# Patient Record
Sex: MALE | Race: WHITE | Employment: FULL TIME | URBAN - METROPOLITAN AREA
[De-identification: names, ages, dates, MRNs, and addresses within clinical notes are randomized per-mention and may not be internally consistent; named-entity substitution may affect disease eponyms.]

---

## 2017-03-30 ENCOUNTER — TELEPHONE (OUTPATIENT)
Dept: ORTHOPEDIC SURGERY | Age: 57
End: 2017-03-30

## 2017-06-12 ENCOUNTER — OFFICE VISIT (OUTPATIENT)
Dept: ORTHOPEDIC SURGERY | Age: 57
End: 2017-06-12

## 2017-06-12 VITALS
WEIGHT: 190 LBS | HEIGHT: 70 IN | SYSTOLIC BLOOD PRESSURE: 146 MMHG | DIASTOLIC BLOOD PRESSURE: 98 MMHG | BODY MASS INDEX: 27.2 KG/M2 | HEART RATE: 63 BPM

## 2017-06-12 DIAGNOSIS — Z96.659 HISTORY OF PARTIAL KNEE REPLACEMENT: Primary | ICD-10-CM

## 2017-06-12 DIAGNOSIS — M25.562 LEFT KNEE PAIN, UNSPECIFIED CHRONICITY: ICD-10-CM

## 2017-06-12 PROCEDURE — 73562 X-RAY EXAM OF KNEE 3: CPT | Performed by: ORTHOPAEDIC SURGERY

## 2017-06-12 PROCEDURE — 99213 OFFICE O/P EST LOW 20 MIN: CPT | Performed by: ORTHOPAEDIC SURGERY

## 2018-07-25 ENCOUNTER — OFFICE VISIT (OUTPATIENT)
Dept: ORTHOPEDIC SURGERY | Age: 58
End: 2018-07-25

## 2018-07-25 VITALS — WEIGHT: 184 LBS | HEIGHT: 70 IN | BODY MASS INDEX: 26.34 KG/M2

## 2018-07-25 DIAGNOSIS — S83.411A SPRAIN OF MEDIAL COLLATERAL LIGAMENT OF RIGHT KNEE, INITIAL ENCOUNTER: Primary | ICD-10-CM

## 2018-07-25 PROCEDURE — 99213 OFFICE O/P EST LOW 20 MIN: CPT | Performed by: PHYSICIAN ASSISTANT

## 2018-07-25 RX ORDER — MELOXICAM 15 MG/1
15 TABLET ORAL DAILY
Qty: 90 TABLET | Refills: 0 | Status: SHIPPED | OUTPATIENT
Start: 2018-07-25 | End: 2019-12-19 | Stop reason: ALTCHOICE

## 2018-07-25 NOTE — PROGRESS NOTES
Chief Complaint    Knee Pain (RT KNEE PAIN AFTER A TWISTING INJURY AT WORK)      History of Present Illness:  Anthony Kasper is a 62 y.o. male  Presents to the office today for a new problem. Patient presents today for his first visit and a Workmen's Compensation injury case. Patient is a  for [employer] and on 7/11/2018  Squatted and twisted and felt a sharp pain over the medial aspect of his knees attempting to  supplies. He has tried ibuprofen, a TENS unit, elevation, ice, and activity modifications without significant relief. He does have  Locking and catching symptoms. He has no past medical history contributing to the region. Pain Assessment  Location of Pain: Knee  Location Modifiers: Right  Severity of Pain: 8  Frequency of Pain: Intermittent  Aggravating Factors: Walking, Standing  Limiting Behavior: Some  Result of Injury: Yes  Work-Related Injury: Yes  Are there other pain locations you wish to document?: No    Medical History:  Past Medical History:   Diagnosis Date    Arthritis     Wears glasses     for reading     Patient Active Problem List    Diagnosis Date Noted    History of partial knee replacement 06/12/2017    Status post left partial knee replacement 04/20/2016    Primary osteoarthritis of left knee 98/82/9212    Plica syndrome 81/52/6649    Chondromalacia of patella 11/24/2014    Tear of medial cartilage or meniscus of knee, current 04/17/2014     Past Surgical History:   Procedure Laterality Date    CARPAL TUNNEL RELEASE Right 0621,1961 (approx)    X 2    COLONOSCOPY  10/22/2012    CYST REMOVAL  2007    rt wrist X2    HERNIA REPAIR  1992    x2--rt inguinal     JOINT REPLACEMENT      KNEE ARTHROSCOPY  2014    left knee    KNEE ARTHROSCOPY Left 12/24/14    partial medial & lateral meniscectomy.  chondrop;asty    KNEE SURGERY  2008, 2010    left, ARTHROSCOPY    OTHER SURGICAL HISTORY Left 4/20/16    LT KNEE ROBOTICALLY ASSISTED  MEDIAL UNICOMPARTMENTAL MAKOPLASTY      Family History   Problem Relation Age of Onset    Diabetes Father     Heart Disease Father     High Blood Pressure Father     Mult Sclerosis Paternal Uncle      Social History     Social History    Marital status:      Spouse name: N/A    Number of children: N/A    Years of education: N/A     Social History Main Topics    Smoking status: Former Smoker     Quit date: 8/31/1990    Smokeless tobacco: None    Alcohol use No    Drug use: No    Sexual activity: Not Asked     Other Topics Concern    None     Social History Narrative    None     Current Outpatient Prescriptions   Medication Sig Dispense Refill    meloxicam (MOBIC) 15 MG tablet Take 1 tablet by mouth daily 90 tablet 0    ibuprofen (ADVIL;MOTRIN) 200 MG tablet Take 200 mg by mouth every 6 hours as needed for Pain       No current facility-administered medications for this visit. Review of Systems:  Relevant review of systems reviewed and available in the patient's chart    Vital Signs:  Ht 5' 9.5\" (1.765 m)   Wt 184 lb (83.5 kg)   BMI 26.78 kg/m²     General Exam:   Constitutional: Patient is adequately groomed with no evidence of malnutrition  DTRs: Deep tendon reflexes are intact  Mental Status: The patient is oriented to time, place and person. The patient's mood and affect are appropriate. Lymphatic: The lymphatic examination bilaterally reveals all areas to be without enlargement or induration. Vascular: Examination reveals no swelling or calf tenderness. Peripheral pulses are palpable and 2+. Neurological: The patient has good coordination. There is no weakness or sensory deficit. Body mass index is 26.78 kg/m². Right Knee Examination:    Inspection:  No swelling, ecchymosis or deformity    Palpation:  Tenderness to palpation directly over the medial joint line.   Mild tenderness at the attachment insertion point of the MCL    Range of Motion:  Well-preserved range of motion,

## 2018-08-22 ENCOUNTER — OFFICE VISIT (OUTPATIENT)
Dept: ORTHOPEDIC SURGERY | Age: 58
End: 2018-08-22

## 2018-08-22 DIAGNOSIS — M17.11 PRIMARY OSTEOARTHRITIS OF RIGHT KNEE: ICD-10-CM

## 2018-08-22 DIAGNOSIS — S83.241A TEAR OF MEDIAL MENISCUS OF RIGHT KNEE, UNSPECIFIED TEAR TYPE, UNSPECIFIED WHETHER OLD OR CURRENT TEAR, INITIAL ENCOUNTER: Primary | ICD-10-CM

## 2018-08-22 PROCEDURE — 99214 OFFICE O/P EST MOD 30 MIN: CPT | Performed by: PHYSICIAN ASSISTANT

## 2018-08-22 NOTE — PROGRESS NOTES
Chief Complaint    Knee Pain (rt knee, locks up, painful)      History of Present Illness:  Koko Hawkins is a 62 y.o. male  turns today for follow-up on his right knee. Patient presents today for his Workmen's Compensation injury case. Patient is a  for [employer] and on 7/11/2018  Squatted and twisted and felt a sharp pain over the medial aspect of his knees attempting to  supplies. He has tried ibuprofen, a TENS unit, elevation, ice, and activity modifications without significant relief. He does have  Locking and catching symptoms. We ordered an MRI of the right knee and he is in today for the results. He has been doing better since she's been on vacation, but he still presents with symptoms with certain motions and activities. Results of the MRI indicate that he does have a 8 mm radial tear of the medial meniscus with mild to moderate osteoarthritic changes of medial compartment. No ligamentous injury.       Pain Assessment  Location of Pain: Knee  Location Modifiers: Right  Severity of Pain: 4  Frequency of Pain: Intermittent  Aggravating Factors: Walking, Standing, Stairs  Limiting Behavior: Some  Result of Injury: Yes  Work-Related Injury: Yes  Are there other pain locations you wish to document?: No    Medical History:  Past Medical History:   Diagnosis Date    Arthritis     Wears glasses     for reading     Patient Active Problem List    Diagnosis Date Noted    History of partial knee replacement 06/12/2017    Status post left partial knee replacement 04/20/2016    Primary osteoarthritis of left knee 57/83/6214    Plica syndrome 01/34/6522    Chondromalacia of patella 11/24/2014    Tear of medial cartilage or meniscus of knee, current 04/17/2014     Past Surgical History:   Procedure Laterality Date    CARPAL TUNNEL RELEASE Right [id number] (approx)    X 2    COLONOSCOPY  10/22/2012    CYST REMOVAL  2007    rt wrist X2    HERNIA REPAIR  1992    x2--rt inguinal     JOINT REPLACEMENT      KNEE ARTHROSCOPY  2014    left knee    KNEE ARTHROSCOPY Left 12/24/14    partial medial & lateral meniscectomy. chondrop;asty    KNEE SURGERY  2008, 2010    left, ARTHROSCOPY    OTHER SURGICAL HISTORY Left 4/20/16    LT KNEE ROBOTICALLY ASSISTED  MEDIAL UNICOMPARTMENTAL MAKOPLASTY      Family History   Problem Relation Age of Onset    Diabetes Father     Heart Disease Father     High Blood Pressure Father     Mult Sclerosis Paternal Uncle      Social History     Social History    Marital status:      Spouse name: N/A    Number of children: N/A    Years of education: N/A     Social History Main Topics    Smoking status: Former Smoker     Quit date: 8/31/1990    Smokeless tobacco: Not on file    Alcohol use No    Drug use: No    Sexual activity: Not on file     Other Topics Concern    Not on file     Social History Narrative    No narrative on file     Current Outpatient Prescriptions   Medication Sig Dispense Refill    meloxicam (MOBIC) 15 MG tablet Take 1 tablet by mouth daily 90 tablet 0    ibuprofen (ADVIL;MOTRIN) 200 MG tablet Take 200 mg by mouth every 6 hours as needed for Pain       No current facility-administered medications for this visit. Review of Systems:  Relevant review of systems reviewed and available in the patient's chart    Vital Signs: There were no vitals taken for this visit. General Exam:   Constitutional: Patient is adequately groomed with no evidence of malnutrition  DTRs: Deep tendon reflexes are intact  Mental Status: The patient is oriented to time, place and person. The patient's mood and affect are appropriate. Lymphatic: The lymphatic examination bilaterally reveals all areas to be without enlargement or induration. Vascular: Examination reveals no swelling or calf tenderness. Peripheral pulses are palpable and 2+. Neurological: The patient has good coordination. There is no weakness or sensory deficit.     There is no height or weight on file to calculate BMI. Right Knee Examination:    Inspection:  No swelling, ecchymosis or deformity    Palpation:  Tenderness to palpation directly over the medial joint line. Mild tenderness at the attachment insertion point of the MCL    Range of Motion:  Well-preserved range of motion, 0-120°. Strength:  4+/5 quad and hamstring strength    Special Tests:  Positive Celeste's examination. Stable to varus and valgus stress testing. Negative Lachman's exam.  Positive patellofemoral grind test    Skin: There are no rashes, ulcerations or lesions. Gait: Mild antalgic gait    Reflex normal deep tendon reflexes    Examination of the  Right and left hip reveals intact skin. The patient demonstrates full painless range of motion with regards to flexion, abduction, internal and external rotation. There is no tenderness about the greater trochanter. There is a negative straight leg raise against resistance. Strength is 5/5 throughout all planes. Radiology:                 Impression:  Encounter Diagnoses   Name Primary?  Tear of medial meniscus of right knee, unspecified tear type, unspecified whether old or current tear, initial encounter Yes    Primary osteoarthritis of right knee        Office Procedures:  No orders of the defined types were placed in this encounter. Treatment Plan:    I've gone over the MRI results with the patient and discussed the options for treatment. Regarding go ahead and recommend his claim to include a medial meniscus tear and osteoarthritis of medial compartment. Once this as been approved we have recommended proceeding with a right knee arthroscopy, partial medial meniscectomy, chondroplasty. Informed consent was obtained for this procedure today. We discussed the risks, benefits, and complications of arthroscopic knee surgery.  The patient realizes that there are concerns with this surgery with respect to infection, deep vein thrombosis, neurological injury, delayed  rehabilitation, the possibility of arthrofibrosis of the knee, and specifically  Hoffa's fat pad fibrosis that can potentially cause difficulties. The patient realizes that there are also anesthetic concerns including cardiopulmonary issues, pulmonary issues, and even possibility of death or dystrophy. The patient voiced understanding to this as well as the normal  rehabilitation  that   is involved with weeks of physical therapy, exercise, and strengthening. The patient also realizes that even though the surgery, from a functional perspective, typically allows the patient to return to good function at about 6 weeks, that it often takes 6 months to completely rehabilitate from this operation. The patient also realizes that if there is an arthritic component to the symptoms, then they may still have some degree of arthritis pain. I have given him some work restrictions limiting the amount of squatting and lifting that he'll be able to do until postoperative treatment has been rendered.

## 2018-09-24 ENCOUNTER — OFFICE VISIT (OUTPATIENT)
Dept: ORTHOPEDIC SURGERY | Age: 58
End: 2018-09-24
Payer: COMMERCIAL

## 2018-09-24 DIAGNOSIS — S83.241D ACUTE MEDIAL MENISCUS TEAR OF RIGHT KNEE, SUBSEQUENT ENCOUNTER: Primary | ICD-10-CM

## 2018-09-24 DIAGNOSIS — M17.11 OSTEOARTHRITIS OF RIGHT KNEE, UNSPECIFIED OSTEOARTHRITIS TYPE: ICD-10-CM

## 2018-09-24 PROCEDURE — 99213 OFFICE O/P EST LOW 20 MIN: CPT | Performed by: ORTHOPAEDIC SURGERY

## 2018-09-24 NOTE — PROGRESS NOTES
Chief Complaint    Knee Pain (rt knee [facility] check, still waiting on his amend dx)      History of Present Illness:  Anthony Kasper is a 62 y.o. male  turns today for follow-up on his right knee. Patient presents today for his Workmen's Compensation injury case. Patient is a  for [employer] and on 7/11/2018  Squatted and twisted and felt a sharp pain over the medial aspect of his knees attempting to  supplies. He has tried ibuprofen, a TENS unit, elevation, ice, and activity modifications without significant relief. Was diagnosed with  He has been doing better since she's been on vacation, but he still presents with symptoms with certain motions and activities. He continues to complain of catching and locking type symptoms. His symptoms have caused him to modify his everyday routine. Patient is currently working light duty. Medical History:  Past Medical History:   Diagnosis Date    Arthritis     Wears glasses     for reading     Patient Active Problem List    Diagnosis Date Noted    History of partial knee replacement 06/12/2017    Status post left partial knee replacement 04/20/2016    Primary osteoarthritis of left knee 58/48/2914    Plica syndrome 04/16/3678    Chondromalacia of patella 11/24/2014    Tear of medial cartilage or meniscus of knee, current 04/17/2014     Past Surgical History:   Procedure Laterality Date    CARPAL TUNNEL RELEASE Right [id number] (approx)    X 2    COLONOSCOPY  10/22/2012    CYST REMOVAL  2007    rt wrist X2    HERNIA REPAIR  1992    x2--rt inguinal     JOINT REPLACEMENT      KNEE ARTHROSCOPY  2014    left knee    KNEE ARTHROSCOPY Left 12/24/14    partial medial & lateral meniscectomy.  chondrop;asty    KNEE SURGERY  2008, 2010    left, ARTHROSCOPY    OTHER SURGICAL HISTORY Left 4/20/16    LT KNEE ROBOTICALLY ASSISTED  MEDIAL UNICOMPARTMENTAL MAKOPLASTY      Family History   Problem Relation Age of Onset    Diabetes Father     Heart Disease Father     High Blood Pressure Father     Mult Sclerosis Paternal Uncle      Social History     Social History    Marital status:      Spouse name: N/A    Number of children: N/A    Years of education: N/A     Social History Main Topics    Smoking status: Former Smoker     Quit date: 8/31/1990    Smokeless tobacco: None    Alcohol use No    Drug use: No    Sexual activity: Not Asked     Other Topics Concern    None     Social History Narrative    None     Current Outpatient Prescriptions   Medication Sig Dispense Refill    meloxicam (MOBIC) 15 MG tablet Take 1 tablet by mouth daily 90 tablet 0    ibuprofen (ADVIL;MOTRIN) 200 MG tablet Take 200 mg by mouth every 6 hours as needed for Pain       No current facility-administered medications for this visit. Review of Systems:  Relevant review of systems reviewed and available in the patient's chart    Vital Signs: There were no vitals taken for this visit. General Exam:   Constitutional: Patient is adequately groomed with no evidence of malnutrition  DTRs: Deep tendon reflexes are intact  Mental Status: The patient is oriented to time, place and person. The patient's mood and affect are appropriate. Lymphatic: The lymphatic examination bilaterally reveals all areas to be without enlargement or induration. Vascular: Examination reveals no swelling or calf tenderness. Peripheral pulses are palpable and 2+. Neurological: The patient has good coordination. There is no weakness or sensory deficit. There is no height or weight on file to calculate BMI. Right Knee Examination:    Inspection:  Positive knee joint effusion. No erythema. Palpation:  Tenderness to palpation directly over the medial joint line. No tenderness over the MCL attachment. Range of Motion:  Well-preserved range of motion, 0-120°. Strength:  4+/5 quad and hamstring strength    Special Tests:  Positive Celeste's examination.   Stable to varus and valgus stress testing. Negative Lachman's exam.  Positive patellofemoral grind test    Skin: There are no rashes, ulcerations or lesions. Gait: Mild antalgic gait    Reflex normal deep tendon reflexes    Examination of the  Right and left hip reveals intact skin. The patient demonstrates full painless range of motion with regards to flexion, abduction, internal and external rotation. There is no tenderness about the greater trochanter. There is a negative straight leg raise against resistance. Strength is 5/5 throughout all planes. Radiology:                 Impression:  Encounter Diagnoses   Name Primary?  Acute medial meniscus tear of right knee, subsequent encounter Yes    Osteoarthritis of right knee, unspecified osteoarthritis type        Office Procedures:  No orders of the defined types were placed in this encounter. Treatment Plan:    I've gone over the MRI results with the patient and discussed the options for treatment. Regarding go ahead and recommend his claim to include a medial meniscus tear and osteoarthritis of medial compartment. Once this as been approved we have recommended proceeding with a right knee arthroscopy, partial medial meniscectomy, chondroplasty. Informed consent was obtained for this procedure today. Patient will continue working light duty at this time. Follow-up in 4 weeks. We discussed the risks, benefits, and complications of arthroscopic knee surgery. The patient realizes that there are concerns with this surgery with respect to infection, deep vein thrombosis, neurological injury, delayed  rehabilitation, the possibility of arthrofibrosis of the knee, and specifically  Hoffa's fat pad fibrosis that can potentially cause difficulties. The patient realizes that there are also anesthetic concerns including cardiopulmonary issues, pulmonary issues, and even possibility of death or dystrophy.  The patient voiced understanding to this as well as the normal

## 2018-10-31 ENCOUNTER — OFFICE VISIT (OUTPATIENT)
Dept: ORTHOPEDIC SURGERY | Age: 58
End: 2018-10-31
Payer: COMMERCIAL

## 2018-10-31 DIAGNOSIS — S83.241D ACUTE MEDIAL MENISCUS TEAR OF RIGHT KNEE, SUBSEQUENT ENCOUNTER: Primary | ICD-10-CM

## 2018-10-31 DIAGNOSIS — S86.911D STRAIN OF RIGHT KNEE AND LEG, SUBSEQUENT ENCOUNTER: ICD-10-CM

## 2018-10-31 PROCEDURE — 99212 OFFICE O/P EST SF 10 MIN: CPT | Performed by: PHYSICIAN ASSISTANT

## 2018-10-31 NOTE — PROGRESS NOTES
range of motion, 0-120°. Strength:  4+/5 quad and hamstring strength    Special Tests:  Positive Celeste's examination. Stable to varus and valgus stress testing. Negative Lachman's exam.  Positive patellofemoral grind test    Skin: There are no rashes, ulcerations or lesions. Gait: Mild antalgic gait    Reflex normal deep tendon reflexes    Radiology:                 Impression:  Encounter Diagnoses   Name Primary?  Acute medial meniscus tear of right knee, subsequent encounter Yes    Strain of right knee and leg, subsequent encounter        Office Procedures:  No orders of the defined types were placed in this encounter. Treatment Plan: At this time we are still waiting for [facility] approval for the recommended diagnosis codes. Once these are approved we'll go ahead and proceed with a right knee arthroscopy. We'll keep him on his current restrictions at this time. No changes in plan of care.

## 2018-11-28 ENCOUNTER — OFFICE VISIT (OUTPATIENT)
Dept: ORTHOPEDIC SURGERY | Age: 58
End: 2018-11-28
Payer: COMMERCIAL

## 2018-11-28 DIAGNOSIS — M17.11 OSTEOARTHRITIS OF RIGHT KNEE, UNSPECIFIED OSTEOARTHRITIS TYPE: Primary | ICD-10-CM

## 2018-11-28 DIAGNOSIS — S83.241D ACUTE MEDIAL MENISCUS TEAR OF RIGHT KNEE, SUBSEQUENT ENCOUNTER: ICD-10-CM

## 2018-11-28 PROBLEM — S83.241A ACUTE MEDIAL MENISCUS TEAR OF RIGHT KNEE: Status: ACTIVE | Noted: 2018-11-28

## 2018-11-28 PROCEDURE — 99213 OFFICE O/P EST LOW 20 MIN: CPT | Performed by: PHYSICIAN ASSISTANT

## 2018-11-28 NOTE — PROGRESS NOTES
Chief Complaint    Knee Pain (rt knee still waiting on dx to be amended)      History of Present Illness:  Marisa Sears is a 62 y.o. male  turns today for follow-up on his right knee. Patient presents today for his Workmen's Compensation injury case. Claim next week as they are denying the claim for exacerbation of osteoarthritis. Patient is a  for [employer] and on 7/11/2018  Squatted and twisted and felt a sharp pain over the medial aspect of his knees attempting to  supplies. Medial meniscus tear. He continues to have medial knee pain with catching and locking of the medial compartment. The area swells particularly towards the end of the day. Medical History:  Past Medical History:   Diagnosis Date    Arthritis     Wears glasses     for reading     Patient Active Problem List    Diagnosis Date Noted    Acute medial meniscus tear of right knee 11/28/2018    Osteoarthritis of right knee 11/28/2018    History of partial knee replacement 06/12/2017    Status post left partial knee replacement 04/20/2016    Primary osteoarthritis of left knee 43/48/9278    Plica syndrome 30/81/8323    Chondromalacia of patella 11/24/2014    Tear of medial cartilage or meniscus of knee, current 04/17/2014     Past Surgical History:   Procedure Laterality Date    CARPAL TUNNEL RELEASE Right 3657,[id number] (approx)    X 2    COLONOSCOPY  10/22/2012    CYST REMOVAL  2007    rt wrist X2    HERNIA REPAIR  1992    x2--rt inguinal     JOINT REPLACEMENT      KNEE ARTHROSCOPY  2014    left knee    KNEE ARTHROSCOPY Left 12/24/14    partial medial & lateral meniscectomy.  chondrop;asty    KNEE SURGERY  2008, 2010    left, ARTHROSCOPY    OTHER SURGICAL HISTORY Left 4/20/16    LT KNEE ROBOTICALLY ASSISTED  MEDIAL UNICOMPARTMENTAL MAKOPLASTY      Family History   Problem Relation Age of Onset    Diabetes Father     Heart Disease Father     High Blood Pressure Father     Mult Sclerosis Paternal Uncle      Social History     Social History    Marital status:      Spouse name: N/A    Number of children: N/A    Years of education: N/A     Social History Main Topics    Smoking status: Former Smoker     Quit date: 8/31/1990    Smokeless tobacco: Not on file    Alcohol use No    Drug use: No    Sexual activity: Not on file     Other Topics Concern    Not on file     Social History Narrative    No narrative on file     Current Outpatient Prescriptions   Medication Sig Dispense Refill    meloxicam (MOBIC) 15 MG tablet Take 1 tablet by mouth daily 90 tablet 0    ibuprofen (ADVIL;MOTRIN) 200 MG tablet Take 200 mg by mouth every 6 hours as needed for Pain       No current facility-administered medications for this visit. Review of Systems:  Relevant review of systems reviewed and available in the patient's chart    Vital Signs: There were no vitals taken for this visit. General Exam:   Constitutional: Patient is adequately groomed with no evidence of malnutrition  DTRs: Deep tendon reflexes are intact  Mental Status: The patient is oriented to time, place and person. The patient's mood and affect are appropriate. Lymphatic: The lymphatic examination bilaterally reveals all areas to be without enlargement or induration. Vascular: Examination reveals no swelling or calf tenderness. Peripheral pulses are palpable and 2+. Neurological: The patient has good coordination. There is no weakness or sensory deficit. There is no height or weight on file to calculate BMI. Right Knee Examination:    Inspection:  Positive knee joint effusion. No erythema. Palpation:  Tenderness to palpation directly over the medial joint line. No tenderness over the MCL attachment. Range of Motion:  Well-preserved range of motion, 0-120°. Strength:  4+/5 quad and hamstring strength    Special Tests:  Positive Celeste's examination. Stable to varus and valgus stress testing.   Negative Lachman's exam.  Positive patellofemoral grind test    Skin: There are no rashes, ulcerations or lesions. Gait: Mild antalgic gait    Reflex normal deep tendon reflexes            Impression:  Encounter Diagnoses   Name Primary?  Osteoarthritis of right knee, unspecified osteoarthritis type Yes    Acute medial meniscus tear of right knee, subsequent encounter        Office Procedures:  No orders of the defined types were placed in this encounter. Treatment Plan: At this time we are still waiting for [facility] approval for the recommended diagnosis codes. Once these are approved we'll go ahead and proceed with a right knee arthroscopy. We'll keep him on his current restrictions at this time. No changes in plan of care.

## 2019-01-02 ENCOUNTER — OFFICE VISIT (OUTPATIENT)
Dept: ORTHOPEDIC SURGERY | Age: 59
End: 2019-01-02
Payer: COMMERCIAL

## 2019-01-02 DIAGNOSIS — M17.11 OSTEOARTHRITIS OF RIGHT KNEE, UNSPECIFIED OSTEOARTHRITIS TYPE: ICD-10-CM

## 2019-01-02 DIAGNOSIS — S83.241D ACUTE MEDIAL MENISCUS TEAR OF RIGHT KNEE, SUBSEQUENT ENCOUNTER: Primary | ICD-10-CM

## 2019-01-02 PROCEDURE — 99213 OFFICE O/P EST LOW 20 MIN: CPT | Performed by: PHYSICIAN ASSISTANT

## 2019-01-21 ENCOUNTER — TELEPHONE (OUTPATIENT)
Dept: ORTHOPEDIC SURGERY | Age: 59
End: 2019-01-21

## 2019-03-01 ENCOUNTER — TELEPHONE (OUTPATIENT)
Dept: ORTHOPEDIC SURGERY | Age: 59
End: 2019-03-01

## 2019-03-08 ENCOUNTER — ANESTHESIA (OUTPATIENT)
Dept: OPERATING ROOM | Age: 59
End: 2019-03-08
Payer: COMMERCIAL

## 2019-03-08 ENCOUNTER — HOSPITAL ENCOUNTER (OUTPATIENT)
Age: 59
Setting detail: OUTPATIENT SURGERY
Discharge: HOME | End: 2019-03-08
Attending: ORTHOPAEDIC SURGERY | Admitting: ORTHOPAEDIC SURGERY
Payer: COMMERCIAL

## 2019-03-08 ENCOUNTER — ANESTHESIA EVENT (OUTPATIENT)
Dept: OPERATING ROOM | Age: 59
End: 2019-03-08
Payer: COMMERCIAL

## 2019-03-08 VITALS
HEART RATE: 59 BPM | RESPIRATION RATE: 18 BRPM | SYSTOLIC BLOOD PRESSURE: 149 MMHG | BODY MASS INDEX: 27.49 KG/M2 | HEIGHT: 70 IN | TEMPERATURE: 96.9 F | WEIGHT: 192 LBS | DIASTOLIC BLOOD PRESSURE: 85 MMHG | OXYGEN SATURATION: 100 %

## 2019-03-08 VITALS
SYSTOLIC BLOOD PRESSURE: 136 MMHG | OXYGEN SATURATION: 97 % | DIASTOLIC BLOOD PRESSURE: 81 MMHG | RESPIRATION RATE: 4 BRPM

## 2019-03-08 DIAGNOSIS — S83.241D ACUTE MEDIAL MENISCUS TEAR OF RIGHT KNEE, SUBSEQUENT ENCOUNTER: Primary | ICD-10-CM

## 2019-03-08 PROCEDURE — 7100000001 HC PACU RECOVERY - ADDTL 15 MIN: Performed by: ORTHOPAEDIC SURGERY

## 2019-03-08 PROCEDURE — 2709999900 HC NON-CHARGEABLE SUPPLY: Performed by: ORTHOPAEDIC SURGERY

## 2019-03-08 PROCEDURE — 2580000003 HC RX 258: Performed by: ORTHOPAEDIC SURGERY

## 2019-03-08 PROCEDURE — 2580000003 HC RX 258: Performed by: NURSE ANESTHETIST, CERTIFIED REGISTERED

## 2019-03-08 PROCEDURE — 2500000003 HC RX 250 WO HCPCS: Performed by: ORTHOPAEDIC SURGERY

## 2019-03-08 PROCEDURE — 7100000000 HC PACU RECOVERY - FIRST 15 MIN: Performed by: ORTHOPAEDIC SURGERY

## 2019-03-08 PROCEDURE — 3600000014 HC SURGERY LEVEL 4 ADDTL 15MIN: Performed by: ORTHOPAEDIC SURGERY

## 2019-03-08 PROCEDURE — 6360000002 HC RX W HCPCS: Performed by: ORTHOPAEDIC SURGERY

## 2019-03-08 PROCEDURE — 7100000011 HC PHASE II RECOVERY - ADDTL 15 MIN: Performed by: ORTHOPAEDIC SURGERY

## 2019-03-08 PROCEDURE — 7100000010 HC PHASE II RECOVERY - FIRST 15 MIN: Performed by: ORTHOPAEDIC SURGERY

## 2019-03-08 PROCEDURE — 3600000004 HC SURGERY LEVEL 4 BASE: Performed by: ORTHOPAEDIC SURGERY

## 2019-03-08 PROCEDURE — 2500000003 HC RX 250 WO HCPCS: Performed by: NURSE ANESTHETIST, CERTIFIED REGISTERED

## 2019-03-08 PROCEDURE — 3700000001 HC ADD 15 MINUTES (ANESTHESIA): Performed by: ORTHOPAEDIC SURGERY

## 2019-03-08 PROCEDURE — 3700000000 HC ANESTHESIA ATTENDED CARE: Performed by: ORTHOPAEDIC SURGERY

## 2019-03-08 PROCEDURE — 6360000002 HC RX W HCPCS: Performed by: NURSE ANESTHETIST, CERTIFIED REGISTERED

## 2019-03-08 PROCEDURE — 2580000003 HC RX 258: Performed by: ANESTHESIOLOGY

## 2019-03-08 RX ORDER — DEXAMETHASONE SODIUM PHOSPHATE 10 MG/ML
INJECTION INTRAMUSCULAR; INTRAVENOUS PRN
Status: DISCONTINUED | OUTPATIENT
Start: 2019-03-08 | End: 2019-03-08 | Stop reason: SDUPTHER

## 2019-03-08 RX ORDER — ONDANSETRON 2 MG/ML
INJECTION INTRAMUSCULAR; INTRAVENOUS PRN
Status: DISCONTINUED | OUTPATIENT
Start: 2019-03-08 | End: 2019-03-08 | Stop reason: SDUPTHER

## 2019-03-08 RX ORDER — PROPOFOL 10 MG/ML
INJECTION, EMULSION INTRAVENOUS PRN
Status: DISCONTINUED | OUTPATIENT
Start: 2019-03-08 | End: 2019-03-08 | Stop reason: SDUPTHER

## 2019-03-08 RX ORDER — BUPIVACAINE HYDROCHLORIDE 2.5 MG/ML
INJECTION, SOLUTION INFILTRATION; PERINEURAL PRN
Status: DISCONTINUED | OUTPATIENT
Start: 2019-03-08 | End: 2019-03-08 | Stop reason: ALTCHOICE

## 2019-03-08 RX ORDER — SODIUM CHLORIDE, SODIUM LACTATE, POTASSIUM CHLORIDE, CALCIUM CHLORIDE 600; 310; 30; 20 MG/100ML; MG/100ML; MG/100ML; MG/100ML
INJECTION, SOLUTION INTRAVENOUS CONTINUOUS
Status: DISCONTINUED | OUTPATIENT
Start: 2019-03-08 | End: 2019-03-08 | Stop reason: HOSPADM

## 2019-03-08 RX ORDER — HYDROCODONE BITARTRATE AND ACETAMINOPHEN 5; 325 MG/1; MG/1
1 TABLET ORAL EVERY 6 HOURS PRN
Qty: 28 TABLET | Refills: 0 | Status: SHIPPED | OUTPATIENT
Start: 2019-03-08 | End: 2019-03-15

## 2019-03-08 RX ORDER — SODIUM CHLORIDE, SODIUM LACTATE, POTASSIUM CHLORIDE, AND CALCIUM CHLORIDE .6; .31; .03; .02 G/100ML; G/100ML; G/100ML; G/100ML
IRRIGANT IRRIGATION PRN
Status: DISCONTINUED | OUTPATIENT
Start: 2019-03-08 | End: 2019-03-08 | Stop reason: ALTCHOICE

## 2019-03-08 RX ORDER — SODIUM CHLORIDE, SODIUM LACTATE, POTASSIUM CHLORIDE, CALCIUM CHLORIDE 600; 310; 30; 20 MG/100ML; MG/100ML; MG/100ML; MG/100ML
INJECTION, SOLUTION INTRAVENOUS CONTINUOUS PRN
Status: DISCONTINUED | OUTPATIENT
Start: 2019-03-08 | End: 2019-03-08 | Stop reason: SDUPTHER

## 2019-03-08 RX ORDER — LIDOCAINE HYDROCHLORIDE 10 MG/ML
2 INJECTION, SOLUTION INFILTRATION; PERINEURAL
Status: DISCONTINUED | OUTPATIENT
Start: 2019-03-08 | End: 2019-03-08 | Stop reason: HOSPADM

## 2019-03-08 RX ORDER — FENTANYL CITRATE 50 UG/ML
INJECTION, SOLUTION INTRAMUSCULAR; INTRAVENOUS PRN
Status: DISCONTINUED | OUTPATIENT
Start: 2019-03-08 | End: 2019-03-08 | Stop reason: SDUPTHER

## 2019-03-08 RX ORDER — GLYCOPYRROLATE 0.2 MG/ML
INJECTION INTRAMUSCULAR; INTRAVENOUS PRN
Status: DISCONTINUED | OUTPATIENT
Start: 2019-03-08 | End: 2019-03-08 | Stop reason: SDUPTHER

## 2019-03-08 RX ORDER — LIDOCAINE HYDROCHLORIDE 20 MG/ML
INJECTION, SOLUTION INFILTRATION; PERINEURAL PRN
Status: DISCONTINUED | OUTPATIENT
Start: 2019-03-08 | End: 2019-03-08 | Stop reason: SDUPTHER

## 2019-03-08 RX ADMIN — FENTANYL CITRATE 25 MCG: 50 INJECTION INTRAMUSCULAR; INTRAVENOUS at 12:46

## 2019-03-08 RX ADMIN — Medication 2 G: at 12:26

## 2019-03-08 RX ADMIN — GLYCOPYRROLATE 0.1 MG: 0.2 INJECTION, SOLUTION INTRAMUSCULAR; INTRAVENOUS at 12:45

## 2019-03-08 RX ADMIN — FENTANYL CITRATE 25 MCG: 50 INJECTION INTRAMUSCULAR; INTRAVENOUS at 12:29

## 2019-03-08 RX ADMIN — DEXAMETHASONE SODIUM PHOSPHATE 10 MG: 10 INJECTION INTRAMUSCULAR; INTRAVENOUS at 12:28

## 2019-03-08 RX ADMIN — ONDANSETRON 4 MG: 2 INJECTION INTRAMUSCULAR; INTRAVENOUS at 12:28

## 2019-03-08 RX ADMIN — SODIUM CHLORIDE, POTASSIUM CHLORIDE, SODIUM LACTATE AND CALCIUM CHLORIDE: 600; 310; 30; 20 INJECTION, SOLUTION INTRAVENOUS at 12:22

## 2019-03-08 RX ADMIN — SODIUM CHLORIDE, POTASSIUM CHLORIDE, SODIUM LACTATE AND CALCIUM CHLORIDE: 600; 310; 30; 20 INJECTION, SOLUTION INTRAVENOUS at 12:11

## 2019-03-08 RX ADMIN — LIDOCAINE HYDROCHLORIDE 80 MG: 20 INJECTION, SOLUTION INFILTRATION; PERINEURAL at 12:25

## 2019-03-08 RX ADMIN — FENTANYL CITRATE 25 MCG: 50 INJECTION INTRAMUSCULAR; INTRAVENOUS at 12:36

## 2019-03-08 RX ADMIN — PROPOFOL 200 MG: 10 INJECTION, EMULSION INTRAVENOUS at 12:26

## 2019-03-08 RX ADMIN — GLYCOPYRROLATE 0.1 MG: 0.2 INJECTION, SOLUTION INTRAMUSCULAR; INTRAVENOUS at 12:41

## 2019-03-08 ASSESSMENT — PULMONARY FUNCTION TESTS
PIF_VALUE: 2
PIF_VALUE: 2
PIF_VALUE: 0
PIF_VALUE: 1
PIF_VALUE: 1
PIF_VALUE: 2
PIF_VALUE: 3
PIF_VALUE: 0
PIF_VALUE: 1
PIF_VALUE: 0
PIF_VALUE: 1
PIF_VALUE: 2
PIF_VALUE: 1
PIF_VALUE: 2
PIF_VALUE: 1
PIF_VALUE: 2
PIF_VALUE: 3
PIF_VALUE: 2
PIF_VALUE: 1
PIF_VALUE: 1
PIF_VALUE: 2
PIF_VALUE: 8
PIF_VALUE: 2
PIF_VALUE: 3
PIF_VALUE: 2
PIF_VALUE: 1
PIF_VALUE: 2
PIF_VALUE: 2
PIF_VALUE: 18
PIF_VALUE: 2
PIF_VALUE: 2
PIF_VALUE: 0
PIF_VALUE: 2
PIF_VALUE: 2

## 2019-03-08 ASSESSMENT — PAIN - FUNCTIONAL ASSESSMENT: PAIN_FUNCTIONAL_ASSESSMENT: 0-10

## 2019-03-08 ASSESSMENT — PAIN SCALES - GENERAL
PAINLEVEL_OUTOF10: 0

## 2019-03-08 ASSESSMENT — PAIN DESCRIPTION - DESCRIPTORS: DESCRIPTORS: NAGGING;THROBBING

## 2019-03-13 ENCOUNTER — HOSPITAL ENCOUNTER (OUTPATIENT)
Dept: PHYSICAL THERAPY | Age: 59
Setting detail: THERAPIES SERIES
Discharge: HOME | End: 2019-03-13
Payer: COMMERCIAL

## 2019-03-13 ENCOUNTER — OFFICE VISIT (OUTPATIENT)
Dept: ORTHOPEDIC SURGERY | Age: 59
End: 2019-03-13

## 2019-03-13 DIAGNOSIS — S83.241A TEAR OF MEDIAL MENISCUS OF RIGHT KNEE, UNSPECIFIED TEAR TYPE, UNSPECIFIED WHETHER OLD OR CURRENT TEAR, INITIAL ENCOUNTER: Primary | ICD-10-CM

## 2019-03-13 PROCEDURE — 97016 VASOPNEUMATIC DEVICE THERAPY: CPT | Performed by: PHYSICAL THERAPIST

## 2019-03-13 PROCEDURE — G8978 MOBILITY CURRENT STATUS: HCPCS | Performed by: PHYSICAL THERAPIST

## 2019-03-13 PROCEDURE — G0283 ELEC STIM OTHER THAN WOUND: HCPCS | Performed by: PHYSICAL THERAPIST

## 2019-03-13 PROCEDURE — 97110 THERAPEUTIC EXERCISES: CPT | Performed by: PHYSICAL THERAPIST

## 2019-03-13 PROCEDURE — 97161 PT EVAL LOW COMPLEX 20 MIN: CPT | Performed by: PHYSICAL THERAPIST

## 2019-03-13 PROCEDURE — 99024 POSTOP FOLLOW-UP VISIT: CPT | Performed by: PHYSICIAN ASSISTANT

## 2019-03-15 ENCOUNTER — HOSPITAL ENCOUNTER (OUTPATIENT)
Dept: PHYSICAL THERAPY | Age: 59
Setting detail: THERAPIES SERIES
Discharge: HOME | End: 2019-03-15
Payer: COMMERCIAL

## 2019-03-18 ENCOUNTER — HOSPITAL ENCOUNTER (OUTPATIENT)
Dept: PHYSICAL THERAPY | Age: 59
Setting detail: THERAPIES SERIES
Discharge: HOME | End: 2019-03-18
Payer: COMMERCIAL

## 2019-03-18 PROCEDURE — 97140 MANUAL THERAPY 1/> REGIONS: CPT

## 2019-03-18 PROCEDURE — 97110 THERAPEUTIC EXERCISES: CPT

## 2019-03-18 PROCEDURE — 97112 NEUROMUSCULAR REEDUCATION: CPT

## 2019-03-18 PROCEDURE — 97016 VASOPNEUMATIC DEVICE THERAPY: CPT

## 2019-03-18 PROCEDURE — G0283 ELEC STIM OTHER THAN WOUND: HCPCS

## 2019-03-21 ENCOUNTER — HOSPITAL ENCOUNTER (OUTPATIENT)
Dept: PHYSICAL THERAPY | Age: 59
Setting detail: THERAPIES SERIES
Discharge: HOME | End: 2019-03-21
Payer: COMMERCIAL

## 2019-03-21 PROCEDURE — 97112 NEUROMUSCULAR REEDUCATION: CPT | Performed by: PHYSICAL THERAPIST

## 2019-03-21 PROCEDURE — G0283 ELEC STIM OTHER THAN WOUND: HCPCS | Performed by: PHYSICAL THERAPIST

## 2019-03-21 PROCEDURE — 97016 VASOPNEUMATIC DEVICE THERAPY: CPT | Performed by: PHYSICAL THERAPIST

## 2019-03-21 PROCEDURE — 97140 MANUAL THERAPY 1/> REGIONS: CPT | Performed by: PHYSICAL THERAPIST

## 2019-03-21 PROCEDURE — 97110 THERAPEUTIC EXERCISES: CPT | Performed by: PHYSICAL THERAPIST

## 2019-03-25 ENCOUNTER — HOSPITAL ENCOUNTER (OUTPATIENT)
Dept: PHYSICAL THERAPY | Age: 59
Setting detail: THERAPIES SERIES
Discharge: HOME | End: 2019-03-25
Payer: COMMERCIAL

## 2019-03-25 PROCEDURE — 97112 NEUROMUSCULAR REEDUCATION: CPT

## 2019-03-25 PROCEDURE — 97140 MANUAL THERAPY 1/> REGIONS: CPT

## 2019-03-25 PROCEDURE — 97110 THERAPEUTIC EXERCISES: CPT

## 2019-03-25 PROCEDURE — 97016 VASOPNEUMATIC DEVICE THERAPY: CPT

## 2019-03-25 PROCEDURE — G0283 ELEC STIM OTHER THAN WOUND: HCPCS

## 2019-03-28 ENCOUNTER — HOSPITAL ENCOUNTER (OUTPATIENT)
Dept: PHYSICAL THERAPY | Age: 59
Setting detail: THERAPIES SERIES
Discharge: HOME | End: 2019-03-28
Payer: COMMERCIAL

## 2019-03-28 PROCEDURE — 97110 THERAPEUTIC EXERCISES: CPT | Performed by: PHYSICAL THERAPIST

## 2019-03-28 PROCEDURE — 97140 MANUAL THERAPY 1/> REGIONS: CPT | Performed by: PHYSICAL THERAPIST

## 2019-03-28 PROCEDURE — G0283 ELEC STIM OTHER THAN WOUND: HCPCS | Performed by: PHYSICAL THERAPIST

## 2019-03-28 PROCEDURE — 97016 VASOPNEUMATIC DEVICE THERAPY: CPT | Performed by: PHYSICAL THERAPIST

## 2019-03-28 PROCEDURE — 97112 NEUROMUSCULAR REEDUCATION: CPT | Performed by: PHYSICAL THERAPIST

## 2019-04-02 ENCOUNTER — HOSPITAL ENCOUNTER (OUTPATIENT)
Dept: PHYSICAL THERAPY | Age: 59
Setting detail: THERAPIES SERIES
Discharge: HOME | End: 2019-04-02
Payer: COMMERCIAL

## 2019-04-02 PROCEDURE — 97112 NEUROMUSCULAR REEDUCATION: CPT

## 2019-04-02 PROCEDURE — 97110 THERAPEUTIC EXERCISES: CPT

## 2019-04-02 PROCEDURE — 97140 MANUAL THERAPY 1/> REGIONS: CPT

## 2019-04-02 PROCEDURE — 97016 VASOPNEUMATIC DEVICE THERAPY: CPT

## 2019-04-02 NOTE — FLOWSHEET NOTE
[facility], [address] [place]  Phone: [phone]  Fax [phone]      ATHLETIC TRAINING [address]  Date:  2019    Patient Name:  David Kay    :  1960  MRN: [id number]  Restrictions/Precautions:    Medical/Treatment Diagnosis Information:  ·   Diagnosis: R knee MMT s/p scope for PMM sx 3/8/19 S83.241D  · Treatment Diagnosis: R knee pain M25.561   Physician Information:  Dr. Ana Rodriguez Post-op  8 wks  12 wks 16 wks 20 wks   24 wks                            Activity Log                                                  DOS/DOI:                                                    Date: 03/25/19  3/28/19 04/02/19   ATC communication      Bike      Elliptical      Treadmill      Airdyne            Gastroc stretch      Soleus stretch      Hamstring stretch      ITB stretch      Hip Flexor stretch      Quad stretch      Adductor stretch            Weight Shifting sp                                fp                                tp      Lateral walking (with/w/o TB)            Balance: PEP/Gilda board                     SLS            Star excursion load/explode            Extremity reach UE/LE            Leg Press Yunior. 60# 2x10 60# 3x10 60# 3x12                     Ecc. 40# 2x10 40# 3x12                     Inv. Calf Press Yunior.  60# 2x10 60# 3x10 60# 3x12                      Ecc.                          Inv.            CARLOS   Flex                 ABd 45# R/L 2x10 45# R/L 2x12 45# R/L 3x10              Add                TKE 45# 20x5\" 60# 20x5\" 60# 30x5\" w/dorsiflexion              Ext 45# R/L 2x10 45# R/L 2x12 45# R/L 3x10         Steps Up                 Up and Over                 Down                 Lateral                 Rotation            Squats  mini                    wall                   BOSU             Lunges:  Lunge to Balance                     Balance to Lunge Walking            Knee Extension Bilat. Ecc.                                 Inv. Hamstring Curls Bilat. 40# 2x10                              Ecc.                                 Inv.            Soleus Press Bilat. Ecc.                             Inv.                                   Ladders                  Square                 Jump/Hop  Low                        Med.                        High                                                                  Modality GR 15' GR 15' GR 15'   Initials                             EP DTM EP   Time spent one on one (workers comp)      Time spent with PT assistant

## 2019-04-02 NOTE — FLOWSHEET NOTE
[facility], [address] [place] [address] [place]  Phone: [phone]  Fax [phone]    Physical Therapy Daily Treatment Note  Date:  2019    Patient Name:  Rupal Hale    :  1960  MRN: [id number]  Restrictions/Precautions:    Medical/Treatment Diagnosis Information:  Diagnosis: R knee MMT s/p scope for PMM sx 3/8/19 S83.241D  Treatment Diagnosis: R knee pain D29.486   Insurance/Certification information:  PT Insurance Information: [facility]   Physician Information:  Referring Practitioner: Dr. Gil Montgomery of care signed (Y/N): sent; POC expires 19     Date of Patient follow up with Physician: 4/10/19     G-Code (if applicable):      Date G-Code Applied:  3/13/19 LEFS 94%       Progress Note: []  Yes  [x]  No  Next due by: Visit #10       Latex Allergy:  [x]NO      []YES  Preferred Language for Healthcare:   [x]English       []other:    Visit # Insurance Allowable Requires auth   6  through 5/3/19     []no        [x]yes:[facility]       Pain level:  3 /10 R knee     SUBJECTIVE:Pt states that his right knee is feeling better    OBJECTIVE: 3.5 weeks p/o   Observation: Pt enters clinic waling w/o AD. Good gait @ slow pace. Improve eccentric quad control from 2\" step descends.   Test measurements:    RESTRICTIONS/PRECAUTIONS: DOI 18     Exercises/Interventions:     Therapeutic Ex Sets/sec Reps Notes   Bike 5 min     Gastroc Stretch on Incline H30x3     Monster @ Half Wall fwd & BWD GTB @ Ankles  2 laps     Plank H10x10     SAQ Mat's Edge 5# H5 2x10     Supine Psoas Release w/ knee flex stretch w/ strap H30x3     SL hip Abd w/ ext press into wall H3 2x10 2# +hep   Supine IT Band Stretch w/ strap H30x3     Prone Quad Set w/ shin press into ball H10x10  +hep   Prone Quad stretch w/ strap H30x3    +hep   Supine QS with SLR  3\" 2x10   Hep    SB bridge with SLR H5 2x10      See ATC sheet  See note  Started 3/21/19 Manual Intervention      STM/Rolling right IT Band, Quad, and Calf 15 min                                   NMR re-education      Posterior Disc Slides OTB 3x10     LSU w/ hips abd Light GTB 4\" 2x10     FSU 6\" 2x10     SLS w/ forward wt tap to step stool 3# 2x10      Reverse BOSU Mini Squat H3 x20     LSD 2\" 3x10 W/ mirror              Therapeutic Exercise and NMR EXR  [x] (75136) Provided verbal/tactile cueing for activities related to strengthening, flexibility, endurance, ROM for improvements in LE, proximal hip, and core control with self care, mobility, lifting, ambulation.  [] (56996) Provided verbal/tactile cueing for activities related to improving balance, coordination, kinesthetic sense, posture, motor skill, proprioception  to assist with LE, proximal hip, and core control in self care, mobility, lifting, ambulation and eccentric single leg control.      NMR and Therapeutic Activities:    [x] (82232 or 67159) Provided verbal/tactile cueing for activities related to improving balance, coordination, kinesthetic sense, posture, motor skill, proprioception and motor activation to allow for proper function of core, proximal hip and LE with self care and ADLs  [x] (03505) Gait Re-education- Provided training and instruction to the patient for proper LE, core and proximal hip recruitment and positioning and eccentric body weight control with ambulation re-education including up and down stairs     Home Exercise Program:    [x] (99324) Reviewed/Progressed HEP activities related to strengthening, flexibility, endurance, ROM of core, proximal hip and LE for functional self-care, mobility, lifting and ambulation/stair navigation   [] (27426)Reviewed/Progressed HEP activities related to improving balance, coordination, kinesthetic sense, posture, motor skill, proprioception of core, proximal hip and LE for self care, mobility, lifting, and ambulation/stair navigation      Manual Treatments:  PROM / STM / Oscillations-Mobs:  G-I, II, III, IV (PA's, Inf., Post.)  [x] (46278) Provided manual therapy to mobilize LE, proximal hip and/or LS spine soft tissue/joints for the purpose of modulating pain, promoting relaxation,  increasing ROM, reducing/eliminating soft tissue swelling/inflammation/restriction, improving soft tissue extensibility and allowing for proper ROM for normal function with self care, mobility, lifting and ambulation. Modalities:  ES HV / V-Comp to R knee for 15 min to R knee with R leg elevated     Charges:  Timed Code Treatment Minutes: 60   Total Treatment Minutes: 75   2TE 5:30 to 6:00pm; MT 6:00-6:15pm, NMR 6:15-6:30pm, V-Comp 6:30-6:45 am  [] EVAL (LOW) 98580 (typically 20 minutes face-to-face)  [] EVAL (MOD) 59369 (typically 30 minutes face-to-face)  [] EVAL (HIGH) 15608 (typically 45 minutes face-to-face)  [] RE-EVAL     [x] FA(13648) x  2   [] IONTO  [x] NMR (99708) x  1   [x] VASO  [x] Manual (29764) x  1    [] Other:  [] TA x       [] Mech Traction (47480)  [] ES(attended) (11322)      [] ES (un) (64553):     GOALS:   Therapist goals for Patient:   Short Term Goals: To be achieved in: 2 weeks  1. Independent in HEP and progression per patient tolerance, in order to prevent re-injury. 2. Patient will have a decrease in pain to facilitate improvement in movement, function, and ADLs as indicated by Functional Deficits. Met    Long Term Goals: To be achieved in: 6-8 weeks  1. Disability index score of 35% or less for the LEFS to assist with reaching prior level of function. 2. Patient will demonstrate increased AROM to R knee ext to 0° and flex to 130°  to allow for proper joint functioning as indicated by patients Functional Deficits. Met 3/21/19  3. Patient will demonstrate an increase in Strength to good proximal hip strength and control, within 5lb HHD in LE to allow for proper functional mobility as indicated by patients Functional Deficits.    4. 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 Patient able to return to work full duty as      Progression Towards Functional goals:  [x] Patient is progressing as expected towards functional goals listed. [] Progression is slowed due to complexities listed. [] Progression has been slowed due to co-morbidities. [] Plan just implemented, too soon to assess goals progression  [] Other:     ASSESSMENT: Steady progress all areas.   Pt very compliant w/ rehab program.    Treatment/Activity Tolerance:  [x] Patient tolerated treatment well [] Patient limited by fatique  [] Patient limited by pain  [] Patient limited by other medical complications  [] Other:     Prognosis: [x] Good [] Fair  [] Poor    Patient Requires Follow-up: [x] Yes  [] No    PLAN: Cont 2xwk  [x] Continue per plan of care [] Alter current plan (see comments)  [] Plan of care initiated [] Hold pending MD visit [] Discharge    Electronically signed by: Cassi Rogers, [address]

## 2019-04-05 ENCOUNTER — HOSPITAL ENCOUNTER (OUTPATIENT)
Dept: PHYSICAL THERAPY | Age: 59
Setting detail: THERAPIES SERIES
Discharge: HOME | End: 2019-04-05
Payer: COMMERCIAL

## 2019-04-05 PROCEDURE — 97140 MANUAL THERAPY 1/> REGIONS: CPT

## 2019-04-05 PROCEDURE — 97110 THERAPEUTIC EXERCISES: CPT

## 2019-04-05 PROCEDURE — G0283 ELEC STIM OTHER THAN WOUND: HCPCS

## 2019-04-05 PROCEDURE — 97016 VASOPNEUMATIC DEVICE THERAPY: CPT

## 2019-04-05 PROCEDURE — 97112 NEUROMUSCULAR REEDUCATION: CPT

## 2019-04-05 NOTE — FLOWSHEET NOTE
[facility], [address] [place]  Phone: [phone]  Fax [phone]      ATHLETIC TRAINING [address]  Date:  2019    Patient Name:  Berenice Bolton    :  1960  MRN: [id number]  Restrictions/Precautions:    Medical/Treatment Diagnosis Information:  ·   Diagnosis: R knee MMT s/p scope for PMM sx 3/8/19 S83.241D  · Treatment Diagnosis: R knee pain M25.561   Physician Information:  Dr. Jose Ramon Mays Post-op  8 wks  12 wks 16 wks 20 wks   24 wks                            Activity Log                                                  DOS/DOI:                                                    Date: 03/25/19  3/28/19 04/02/19 04/05/19   ATC communication       Bike       Elliptical       Treadmill       Airdyne              Gastroc stretch       Soleus stretch       Hamstring stretch       ITB stretch       Hip Flexor stretch       Quad stretch       Adductor stretch              Weight Shifting sp                                 fp                                 tp       Lateral walking (with/w/o TB)              Balance: PEP/Gilda board                      SLS             Star excursion load/explode             Extremity reach UE/LE              Leg Press Yunior. 60# 2x10 60# 3x10 60# 3x12 80# 2x10                     Ecc. 40# 2x10 40# 3x12 60# 2x10                     Inv. Calf Press Yunior.  60# 2x10 60# 3x10 60# 3x12 80# 2x10                      Ecc.                           Inv.              CARLOS   Flex                  ABd 45# R/L 2x10 45# R/L 2x12 45# R/L 3x10 45# R/L 3x12              Add                 TKE 45# 20x5\" 60# 20x5\" 60# 30x5\" w/dorsiflexion 75# 20x5\"              Ext 45# R/L 2x10 45# R/L 2x12 45# R/L 3x10 45# R/L 3x12          Steps Up                  Up and Over                  Down                  Lateral                  Rotation              Squats  mini wall                    BOSU               Lunges:  Lunge to Balance                      Balance to Lunge                      Walking              Knee Extension Bilat. Ecc.                                  Inv. Hamstring Curls Bilat. 40# 2x10 40# 2x12                              Ecc.                                  Inv.              Soleus Press Bilat. Ecc.                              Inv.                                      Ladders                   Square                  Jump/Hop  Low                         Med.                         High                                                                     Modality GR 15' GR 15' GR 15' PM/CP 13'   Initials                             EP DTM EP EP   Time spent one on one (workers comp)       Time spent with PT assistant

## 2019-04-05 NOTE — FLOWSHEET NOTE
[facility], [address] [place] [address] [place]  Phone: [phone]  Fax [phone]    Physical Therapy Daily Treatment Note  Date:  2019    Patient Name:  Tamiko Horn    :  1960  MRN: [id number]  Restrictions/Precautions:    Medical/Treatment Diagnosis Information:  Diagnosis: R knee MMT s/p scope for PMM sx 3/8/19 S83.241D  Treatment Diagnosis: R knee pain E46.894   Insurance/Certification information:  PT Insurance Information: [facility]   Physician Information:  Referring Practitioner: Dr. Jose Fernandez of care signed (Y/N): sent; POC expires 19     Date of Patient follow up with Physician: 4/10/19     G-Code (if applicable):      Date G-Code Applied:  3/13/19 LEFS 94%       Progress Note: []  Yes  [x]  No  Next due by: Visit #10       Latex Allergy:  [x]NO      []YES  Preferred Language for Healthcare:   [x]English       []other:    Visit # Insurance Allowable Requires auth   7  through 5/3/19     []no        [x]yes:[facility]       Pain level:  3 /10 R knee     SUBJECTIVE:Muscles a little sore for a day after LV, then knee felt better. OBJECTIVE: 4 weeks p/o   Observation:Able to progress CKC TE w/ good tolerance and less VC to maintain neutral knee posturing.   Test measurements: Right knee AROM 0-133    RESTRICTIONS/PRECAUTIONS: DOI 18     Exercises/Interventions:     Therapeutic Ex Sets/sec Reps Notes   Bike 5 min     Gastroc Stretch on Incline H30x3     HS Stool Walk 2 laps     Trustretch rocks and hold 10R/ 60\"     Monster @ Half Wall fwd & BWD GTB @ Ankles  2 laps     Plank H10x10     SAQ Mat's Edge 5# H5 2x10     Supine Psoas Release w/ knee flex stretch w/ strap H30x3     SL hip Abd w/ ext press into wall H3 2x10 2# +hep   Supine IT Band Stretch w/ strap H30x3     Prone Quad Set w/ shin press into ball H10x10  +hep   Prone Quad stretch w/ strap H30x3    +hep   Supine QS with SLR  3\" 2x10   Hep    SB bridge with SLR H5 2x10      See ATC sheet  See note  Started 3/21/19                           Manual Intervention      STM/Rolling right IT Band, Quad, and Calf 15 min                                   NMR re-education      Standing BAPS Level 2 Df/pf x20  In/ev x20\CWx20  CCWx20     Stairs x5     Lateral Cable Walk w/ Tap Matrix 15#  x10         LSU Laterals 8\" 2x10     FSU Knee Tap/Posterior Lunge 4\" 2x10     SLS w/ forward wt tap to step stool 3# 2x10          LSD 4\" 2x10 W/ mirror              Therapeutic Exercise and NMR EXR  [x] (23500) Provided verbal/tactile cueing for activities related to strengthening, flexibility, endurance, ROM for improvements in LE, proximal hip, and core control with self care, mobility, lifting, ambulation.  [] (36561) Provided verbal/tactile cueing for activities related to improving balance, coordination, kinesthetic sense, posture, motor skill, proprioception  to assist with LE, proximal hip, and core control in self care, mobility, lifting, ambulation and eccentric single leg control.      NMR and Therapeutic Activities:    [x] (05715 or 56350) Provided verbal/tactile cueing for activities related to improving balance, coordination, kinesthetic sense, posture, motor skill, proprioception and motor activation to allow for proper function of core, proximal hip and LE with self care and ADLs  [x] (00465) Gait Re-education- Provided training and instruction to the patient for proper LE, core and proximal hip recruitment and positioning and eccentric body weight control with ambulation re-education including up and down stairs     Home Exercise Program:    [x] (25987) Reviewed/Progressed HEP activities related to strengthening, flexibility, endurance, ROM of core, proximal hip and LE for functional self-care, mobility, lifting and ambulation/stair navigation   [] (21843)Reviewed/Progressed HEP activities related to improving balance, coordination, kinesthetic sense, posture, motor skill, control, within 5lb HHD in LE to allow for proper functional mobility as indicated by patients Functional Deficits. 4. 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 Patient able to return to work full duty as      Progression Towards Functional goals:  [x] Patient is progressing as expected towards functional goals listed. [] Progression is slowed due to complexities listed. [] Progression has been slowed due to co-morbidities. [] Plan just implemented, too soon to assess goals progression  [] Other:     ASSESSMENT: Improved eccentric quad motor control from 4\" step.     Treatment/Activity Tolerance:  [x] Patient tolerated treatment well [] Patient limited by fatique  [] Patient limited by pain  [] Patient limited by other medical complications  [] Other:     Prognosis: [x] Good [] Fair  [] Poor    Patient Requires Follow-up: [x] Yes  [] No    PLAN: Cont 2xwk  [x] Continue per plan of care [] Alter current plan (see comments)  [] Plan of care initiated [] Hold pending MD visit [] Discharge    Electronically signed by: Jerald Melendez

## 2019-04-08 ENCOUNTER — HOSPITAL ENCOUNTER (OUTPATIENT)
Dept: PHYSICAL THERAPY | Age: 59
Setting detail: THERAPIES SERIES
Discharge: HOME | End: 2019-04-08
Payer: COMMERCIAL

## 2019-04-08 PROCEDURE — 97140 MANUAL THERAPY 1/> REGIONS: CPT

## 2019-04-08 PROCEDURE — 97110 THERAPEUTIC EXERCISES: CPT

## 2019-04-08 PROCEDURE — 97112 NEUROMUSCULAR REEDUCATION: CPT

## 2019-04-08 PROCEDURE — G0283 ELEC STIM OTHER THAN WOUND: HCPCS

## 2019-04-08 NOTE — FLOWSHEET NOTE
[facility], [address] [place]  Phone: [phone]  Fax [phone]      ATHLETIC TRAINING [address]  Date:  2019    Patient Name:  Rupal Hale    :  1960  MRN: [id number]  Restrictions/Precautions:    Medical/Treatment Diagnosis Information:  ·   Diagnosis: R knee MMT s/p scope for PMM sx 3/8/19 S83.241D  · Treatment Diagnosis: R knee pain M25.561   Physician Information:  Dr. Lorenza Morse Post-op  8 wks  12 wks 16 wks 20 wks   24 wks                            Activity Log                                                  DOS/DOI:                                                    Date: 03/25/19  3/28/19 04/02/19 04/05/19 04/08/19   ATC communication        Bike        Elliptical        Treadmill        Airdyne                Gastroc stretch        Soleus stretch        Hamstring stretch        ITB stretch        Hip Flexor stretch        Quad stretch        Adductor stretch                Weight Shifting sp                                  fp                                  tp        Lateral walking (with/w/o TB)                Balance: PEP/Gilda board                       SLS              Star excursion load/explode              Extremity reach UE/LE                Leg Press Yunior. 60# 2x10 60# 3x10 60# 3x12 80# 2x10 80# 3x10                     Ecc. 40# 2x10 40# 3x12 60# 2x10 60# 3x10                     Inv. Calf Press Yunior.  60# 2x10 60# 3x10 60# 3x12 80# 2x10 80# 3x10                      Ecc.                            Inv.                CARLOS   Flex                   ABd 45# R/L 2x10 45# R/L 2x12 45# R/L 3x10 45# R/L 3x12 60# R/L 2x10              Add                  TKE 45# 20x5\" 60# 20x5\" 60# 30x5\" w/dorsiflexion 75# 20x5\" 75# 30x5\"              Ext 45# R/L 2x10 45# R/L 2x12 45# R/L 3x10 45# R/L 3x12 60# R/L 2x10           Steps Up                   Up and Over Down                   Lateral                   Rotation                Squats  mini                      wall                     BOSU                 Lunges:  Lunge to Balance                       Balance to Lunge                       Walking                Knee Extension Bilat. Ecc.                                   Inv. Hamstring Curls Bilat. 40# 2x10 40# 2x12 40# 3x10                              Ecc.                                   Inv.                Soleus Press Bilat. Ecc.                               Inv.                                         Ladders                    Square                   Jump/Hop  Low                          Med.                          High                                                                        Modality GR 15' GR 15' GR 15' PM/CP 13' PM/CP 15'   Initials                             EP DTM EP EP EP   Time spent one on one (workers comp)        Time spent with PT assistant

## 2019-04-08 NOTE — FLOWSHEET NOTE
[facility], [address] [place] [address]  Phone: [phone]  Fax [phone]    Physical Therapy Daily Treatment Note  Date:  2019    Patient Name:  Berenice Bolton    :  1960  MRN: [id number]  Restrictions/Precautions:    Medical/Treatment Diagnosis Information:  Diagnosis: R knee MMT s/p scope for PMM sx 3/8/19 S83.241D  Treatment Diagnosis: R knee pain D77.369   Insurance/Certification information:  PT Insurance Information: [facility]   Physician Information:  Referring Practitioner: Dr. Cindy Land of care signed (Y/N): sent; POC expires 19     Date of Patient follow up with Physician: 4/10/19     G-Code (if applicable):      Date G-Code Applied:  3/13/19 LEFS 94%       Progress Note: []  Yes  [x]  No  Next due by: Visit #10       Latex Allergy:  [x]NO      []YES  Preferred Language for Healthcare:   [x]English       []other:    Visit # Insurance Allowable Requires auth   8  through 5/3/19     []no        [x]yes:[facility]       Pain level:  2-3/10 R knee     SUBJECTIVE: Mild discomfort anterior-medial joint line and retropatellar joint. OBJECTIVE: 4.5 weeks p/o   Observation: No remarkable edema. DC's V-comp.   Test measurements:  (Previous: Right knee AROM 0-133)    RESTRICTIONS/PRECAUTIONS: DOI 18     Exercises/Interventions:     Therapeutic Ex Sets/sec Reps Notes   Bike 5 min     Gastroc Stretch on Incline H30x3     HS Stool Walk 2 laps     Trustretch rocks and hold 10R/ 60\"     Monster @ Half Wall fwd & BWD GTB @ Ankles  2 laps     Plank H10x10     SAQ Mat's Edge 6# H5 2x10     Supine Psoas Release w/ knee flex stretch w/ strap H30x3     SL hip Abd w/ ext press into wall 2.5# H3 2x10  +hep   Supine IT Band Stretch w/ strap H30x3     Prone Quad Set w/ shin press into ball H10x10  +hep   Prone Quad stretch w/ strap H30x3    +hep   Supine QS with SLR  3\" 2x10   Hep    SB bridge with SLR H5 2x10      See ATC proprioception of core, proximal hip and LE for self care, mobility, lifting, and ambulation/stair navigation      Manual Treatments:  PROM / STM / Oscillations-Mobs:  G-I, II, III, IV (PA's, Inf., Post.)  [x] (94373) Provided manual therapy to mobilize LE, proximal hip and/or LS spine soft tissue/joints for the purpose of modulating pain, promoting relaxation,  increasing ROM, reducing/eliminating soft tissue swelling/inflammation/restriction, improving soft tissue extensibility and allowing for proper ROM for normal function with self care, mobility, lifting and ambulation. Modalities:  ES HV / V-Comp to R knee for 15 min to R knee with R leg elevated     Charges:  Timed Code Treatment Minutes: 60   Total Treatment Minutes: 75   2TE 10:30 to 11:00am; MT 11:00-11:15am, NMR 11:15-11:30am, ESCP 11:30-11:45 am  [] EVAL (LOW) 66691 (typically 20 minutes face-to-face)  [] EVAL (MOD) 06844 (typically 30 minutes face-to-face)  [] EVAL (HIGH) 74816 (typically 45 minutes face-to-face)  [] RE-EVAL     [x] YC(60077) x  2   [] IONTO  [x] NMR (06638) x  1   [x] VASO  [x] Manual (56501) x  1    [] Other:  [] TA x       [] Mech Traction (74791)  [] ES(attended) (83453)      [x] ES (un) (12752):     GOALS:   Therapist goals for Patient:   Short Term Goals: To be achieved in: 2 weeks  1. Independent in HEP and progression per patient tolerance, in order to prevent re-injury. Met  2. Patient will have a decrease in pain to facilitate improvement in movement, function, and ADLs as indicated by Functional Deficits. Met    Long Term Goals: To be achieved in: 6-8 weeks  1. Disability index score of 35% or less for the LEFS to assist with reaching prior level of function. 2. Patient will demonstrate increased AROM to R knee ext to 0° and flex to 130°  to allow for proper joint functioning as indicated by patients Functional Deficits. Met 3/21/19  3.  Patient will demonstrate an increase in Strength to good proximal hip strength and

## 2019-04-10 ENCOUNTER — HOSPITAL ENCOUNTER (OUTPATIENT)
Dept: PHYSICAL THERAPY | Age: 59
Setting detail: THERAPIES SERIES
Discharge: HOME | End: 2019-04-10
Payer: COMMERCIAL

## 2019-04-10 ENCOUNTER — OFFICE VISIT (OUTPATIENT)
Dept: ORTHOPEDIC SURGERY | Age: 59
End: 2019-04-10

## 2019-04-10 DIAGNOSIS — S83.241A TEAR OF MEDIAL MENISCUS OF RIGHT KNEE, UNSPECIFIED TEAR TYPE, UNSPECIFIED WHETHER OLD OR CURRENT TEAR, INITIAL ENCOUNTER: Primary | ICD-10-CM

## 2019-04-10 PROCEDURE — 97112 NEUROMUSCULAR REEDUCATION: CPT | Performed by: PHYSICAL THERAPIST

## 2019-04-10 PROCEDURE — 99024 POSTOP FOLLOW-UP VISIT: CPT | Performed by: PHYSICIAN ASSISTANT

## 2019-04-10 PROCEDURE — 97110 THERAPEUTIC EXERCISES: CPT | Performed by: PHYSICAL THERAPIST

## 2019-04-10 PROCEDURE — G0283 ELEC STIM OTHER THAN WOUND: HCPCS | Performed by: PHYSICAL THERAPIST

## 2019-04-10 PROCEDURE — 97140 MANUAL THERAPY 1/> REGIONS: CPT | Performed by: PHYSICAL THERAPIST

## 2019-04-10 NOTE — PROGRESS NOTES
History of present illness: The patient returns today for a followup after knee arthroscopy. Pain control has been satisfactory with oral medications. He is still currently in outpatient physical therapy with several remaining visits. Pain Assessment  Location of Pain: Knee  Location Modifiers: Right  Severity of Pain: 3  Frequency of Pain: Intermittent  Aggravating Factors: Walking, Standing  Limiting Behavior: Some  Result of Injury: Yes  Work-Related Injury: Yes  Are there other pain locations you wish to document?: No]    Physical examination: Incisions are well healed with no signs of infection. The patient demonstrates full active range of motion. Quad tone is adequate. Normal gait without the use of ambulatory devices. Assessment/plan: The patient is doing well after knee arthroscopy. I would like for him to complete outpatient physical therapy. We'll  Return him back to work effective April 15 with some light duty restrictions for 2 weeks we'll limit the number of hours per day and  Lifting restrictions to no more than 25 pounds. Effective April 29 he can return to work full duty.

## 2019-04-10 NOTE — FLOWSHEET NOTE
w/ strap H30x3     Prone Quad Set w/ shin press into ball H10x10  +hep   Prone Quad stretch w/ strap H30x3    +hep   Supine QS with SLR  3\" 2x10   Hep    SB bridge with SLR H5 2x10      See ATC sheet  See note  Started 3/21/19                           Manual Intervention      STM/Rolling right IT Band, Quad, and Calf 15 min                                   NMR re-education      Standing BAPS Level 2 Df/pf 2x20       Lateral Cable Walk w/ Tap Matrix 15#  x10         LSU Laterals with BOSU  2x10     FSU Knee Tap/Posterior Lunge 6\" 2x10     SLS w/ forward wt tap to step stool 3# 2x10      BOSU [place]      LSD 4\" 2x10 W/ mirror              Therapeutic Exercise and NMR EXR  [x] (75111) Provided verbal/tactile cueing for activities related to strengthening, flexibility, endurance, ROM for improvements in LE, proximal hip, and core control with self care, mobility, lifting, ambulation.  [] (12745) Provided verbal/tactile cueing for activities related to improving balance, coordination, kinesthetic sense, posture, motor skill, proprioception  to assist with LE, proximal hip, and core control in self care, mobility, lifting, ambulation and eccentric single leg control.      NMR and Therapeutic Activities:    [x] (15910 or 42997) Provided verbal/tactile cueing for activities related to improving balance, coordination, kinesthetic sense, posture, motor skill, proprioception and motor activation to allow for proper function of core, proximal hip and LE with self care and ADLs  [x] (08490) Gait Re-education- Provided training and instruction to the patient for proper LE, core and proximal hip recruitment and positioning and eccentric body weight control with ambulation re-education including up and down stairs     Home Exercise Program:    [x] (49325) Reviewed/Progressed HEP activities related to strengthening, flexibility, endurance, ROM of core, proximal hip and LE for functional self-care, mobility, lifting and functioning as indicated by patients Functional Deficits. Met 3/21/19  3. Patient will demonstrate an increase in Strength to good proximal hip strength and control, within 5lb HHD in LE to allow for proper functional mobility as indicated by patients Functional Deficits. 4. 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 Patient able to return to work full duty as      Progression Towards Functional goals:  [x] Patient is progressing as expected towards functional goals listed. [] Progression is slowed due to complexities listed. [] Progression has been slowed due to co-morbidities.   [] Plan just implemented, too soon to assess goals progression  [] Other:     ASSESSMENT: improving ecc quad control with step downs   Treatment/Activity Tolerance:  [x] Patient tolerated treatment well [] Patient limited by fatique  [] Patient limited by pain  [] Patient limited by other medical complications  [] Other:     Prognosis: [x] Good [] Fair  [] Poor    Patient Requires Follow-up: [x] Yes  [] No    PLAN: Cont 2xwk  [x] Continue per plan of care [] Alter current plan (see comments)  [] Plan of care initiated [] Hold pending MD visit [] Discharge    Electronically signed by: Jo Ann Smith, GIOVANA   [id number]

## 2019-04-15 ENCOUNTER — HOSPITAL ENCOUNTER (OUTPATIENT)
Dept: PHYSICAL THERAPY | Age: 59
Setting detail: THERAPIES SERIES
Discharge: HOME | End: 2019-04-15
Payer: COMMERCIAL

## 2019-04-15 PROCEDURE — 97110 THERAPEUTIC EXERCISES: CPT

## 2019-04-15 PROCEDURE — 97112 NEUROMUSCULAR REEDUCATION: CPT

## 2019-04-15 PROCEDURE — 97140 MANUAL THERAPY 1/> REGIONS: CPT

## 2019-04-15 PROCEDURE — G0283 ELEC STIM OTHER THAN WOUND: HCPCS

## 2019-04-15 NOTE — PLAN OF CARE
[facility], [address]     Physical Therapy 10th Visit Progress Note    Date: 4/15/2019        Patient Name:  Tamiko Horn    :  1960  MRN: [id number]  Referring Physician:Dr. Denise Mitchell  · Diagnosis:  R knee MMT s/p scope for PMM sx 3/8/19 S83.241D  ·       ICD Code: K98.108T,R60.480   Therapy Diagnosis/Practice Pattern: 4E     Total number of visits: 10  Reporting Period:   Beginning Date: 3-   End Date: 4-    OBJECTIVE  Test used Initial score Current Score   Pain Summary 0-10 -8/10 /10   Functional questionnaire LEFS 94% 65%   Functional Testing  2 crutches, WBAT right LE antalgic gait Good gait, no AD. Reciprocal stairs. JL edema  36 cm 35 cm   ROM Right Knee flexion 116 140    extension -1 0   Strength SLR/ Hip Flexors 4-/5 R= 33.6 lb  L= 29.1 lb    Hip Abd  R= 40.5 lb  L=45.0 lb    Knee ext 4-/5 R= 42.8 lb  L= 50.5 lb    Knee flex 4/5 5/5        Functional Limitation G-Code (if applicable):         PT G-Codes  Functional Assessment Tool Used: LEFS  Score: 65%  Functional Limitation: Mobility: Walking and moving around   Test/tests used to determine % limitation:LEFS  Actual Score used to drive % limitation: 11%    Treatment to date:  [x] Therapeutic Exercise    [x] Modalities:  [] Therapeutic Activity             []Ultrasound            [x]Electrical Stimulation  [x] Gait Training     []Cervical Traction    [] Lumbar Traction  [x] Neuromuscular Re-education [] Cold/hotpack         []Iontophoresis  [x] Instruction in HEP      Other:  [x] Manual Therapy                   [x]V-Comp                       ? []  Assessment:  [x] Improvement noted relative to goals:  1. Disability index score of 35% or less for the LEFS to assist with reaching prior level of function. Decreased to 65% 4-  2.  Patient will demonstrate increased AROM to R knee ext to 0° and flex to 130°  to allow for proper joint functioning as indicated by patients Functional Deficits. Met 3/21/19  3. Patient will demonstrate an increase in Strength to good proximal hip strength and control, within 5lb HHD in LE to allow for proper functional mobility as indicated by patients Functional Deficits. Approaching, but still hip abd and eccentric quad strength deficits 4-. 4. 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 Met 4-  5.  Patient able to return to work full duty as  Returned 4 hr/day 4-      [] No Improvement noted related to goals:/Patient's response to treatment/Additional assessment:    New or Updated Goals (if applicable):  [x] No change to goals established upon initial eval/last progress note:  New Goals:    Electronically signed by:  Rayo Verma [address]

## 2019-04-15 NOTE — FLOWSHEET NOTE
[facility], [address] [place] [address] [place]  Phone: [phone]  Fax [phone]    Physical Therapy Daily Treatment Note  Date:  4/15/2019    Patient Name:  Luisa Troy    :  1960  MRN: [id number]  Restrictions/Precautions:    Medical/Treatment Diagnosis Information:  Diagnosis: R knee MMT s/p scope for PMM sx 3/8/19 S83.241D  Treatment Diagnosis: R knee pain R71.130   Insurance/Certification information:  PT Insurance Information: [facility]   Physician Information:  Referring Practitioner: Dr. Mccrary Freeze of care signed (Y/N): sent; POC expires 19     Date of Patient follow up with Physician: 4/10/19     G-Code (if applicable):      Date G-Code Applied:  3/13/19 LEFS 94%  PT G-Codes  Functional Assessment Tool Used: LEFS  Score: 65%  Functional Limitation: Mobility: Walking and moving around    Progress Note: []  Yes  [x]  No  Next due by: Visit #10       Latex Allergy:  [x]NO      []YES  Preferred Language for Healthcare:   [x]English       []other:    Visit # Insurance Allowable Requires auth   10  through 5/3/19     []no        [x]yes:[facility]       Pain level:  3/10 R knee     SUBJECTIVE: Sat @ a concert in tight seats over the weekend. Fronts of both knees really ached and right knee hurt for day afterwards. Returned to part time work this morning for 4 our shift with 25# lifting restriction. OBJECTIVE: 5.5 weeks . Observation: see 10th visit progress note.   Test measurements: LEFS score decreased to 65% from 94%    RESTRICTIONS/PRECAUTIONS: DOI 18     Exercises/Interventions:     Therapeutic Ex Sets/sec Reps Notes   Bike 5 min     Gastroc Stretch on Incline H30x3     HS Stool Walk 2 laps     Trustretch rocks and hold 10R/ 60\"     Monster @ Half Wall fwd & BWD GTB @ Ankles  2 laps     Plank H10x10     SAQ Mat's Edge 7# H5 2x10     Supine Psoas Release w/ knee flex stretch w/ strap H30x3     SL hip Abd w/ ext press into wall 2.5# 3x10   +hep   Supine IT Band Stretch w/ strap H30x3     Prone Quad Set w/ shin press into ball H10x10  +hep   Prone Quad stretch w/ strap H30x3    +hep   Supine QS with SLR  3\" 2x10   Hep    SB bridge with SLR H5 2x10      See ATC sheet  See note  Started 3/21/19                           Manual Intervention      STM/Rolling right IT Band, Quad, and Calf 15 min                                   NMR re-education      Lateral Cable Walk w/ Tap Matrix 20#  2x10     FSU Knee Tap/Posterior Lunge 6\" 3x10     SLS w/ forward wt tap to step stool 3# 2x10      LSD 4\" 3x10 W/ mirror    BOSU Laterals 2x10         Therapeutic Exercise and NMR EXR  [x] (52564) Provided verbal/tactile cueing for activities related to strengthening, flexibility, endurance, ROM for improvements in LE, proximal hip, and core control with self care, mobility, lifting, ambulation.  [] (26238) Provided verbal/tactile cueing for activities related to improving balance, coordination, kinesthetic sense, posture, motor skill, proprioception  to assist with LE, proximal hip, and core control in self care, mobility, lifting, ambulation and eccentric single leg control.      NMR and Therapeutic Activities:    [x] (46365 or 30432) Provided verbal/tactile cueing for activities related to improving balance, coordination, kinesthetic sense, posture, motor skill, proprioception and motor activation to allow for proper function of core, proximal hip and LE with self care and ADLs  [x] (50316) Gait Re-education- Provided training and instruction to the patient for proper LE, core and proximal hip recruitment and positioning and eccentric body weight control with ambulation re-education including up and down stairs     Home Exercise Program:    [x] (08910) Reviewed/Progressed HEP activities related to strengthening, flexibility, endurance, ROM of core, proximal hip and LE for functional self-care, mobility, lifting and ambulation/stair navigation [] (53824)Reviewed/Progressed HEP activities related to improving balance, coordination, kinesthetic sense, posture, motor skill, proprioception of core, proximal hip and LE for self care, mobility, lifting, and ambulation/stair navigation      Manual Treatments:  PROM / STM / Oscillations-Mobs:  G-I, II, III, IV (PA's, Inf., Post.)  [x] (02775) Provided manual therapy to mobilize LE, proximal hip and/or LS spine soft tissue/joints for the purpose of modulating pain, promoting relaxation,  increasing ROM, reducing/eliminating soft tissue swelling/inflammation/restriction, improving soft tissue extensibility and allowing for proper ROM for normal function with self care, mobility, lifting and ambulation. Modalities:  ES HV to R knee for 15 min to R knee       Charges:  Timed Code Treatment Minutes: 75   Total Treatment Minutes: 90   2TE 10:00-10:30am; MT 10:30-10:45am, 2NMR 10:45-11:15 am,   [] EVAL (LOW) 36475 (typically 20 minutes face-to-face)  [] EVAL (MOD) 61080 (typically 30 minutes face-to-face)  [] EVAL (HIGH) 20826 (typically 45 minutes face-to-face)  [] RE-EVAL     [x] ZY(46299) x  2   [] IONTO  [x] NMR (78487) x  2   [] VASO  [x] Manual (90815) x  1    [] Other:  [] TA x       [] Mech Traction (27655)  [] ES(attended) (02080)      [x] ES (un) (01158):     GOALS:   Therapist goals for Patient:   Short Term Goals: To be achieved in: 2 weeks  1. Independent in HEP and progression per patient tolerance, in order to prevent re-injury. Met  2. Patient will have a decrease in pain to facilitate improvement in movement, function, and ADLs as indicated by Functional Deficits. Met    Long Term Goals: To be achieved in: 6-8 weeks  1. Disability index score of 35% or less for the LEFS to assist with reaching prior level of function. Decreased to 65% 4-  2.  Patient will demonstrate increased AROM to R knee ext to 0° and flex to 130°  to allow for proper joint functioning as indicated by patients Functional Deficits. Met 3/21/19  3. Patient will demonstrate an increase in Strength to good proximal hip strength and control, within 5lb HHD in LE to allow for proper functional mobility as indicated by patients Functional Deficits. Approaching, but still hip abd and eccentric quad strength deficits 4-. 4. 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 Met 4-  5. Patient able to return to work full duty as  Returned 4 hr/day 4-    Progression Towards Functional goals:  [x] Patient is progressing as expected towards functional goals listed. [] Progression is slowed due to complexities listed. [] Progression has been slowed due to co-morbidities. [] Plan just implemented, too soon to assess goals progression  [] Other:     ASSESSMENT: Steady progress in all areas. Still gluteal and eccentric quad strength deficits on right. Good gait w/o AD.     Treatment/Activity Tolerance:  [x] Patient tolerated treatment well [] Patient limited by fatique  [] Patient limited by pain  [] Patient limited by other medical complications  [] Other:     Prognosis: [x] Good [] Fair  [] Poor    Patient Requires Follow-up: [x] Yes  [] No    PLAN: Cont 2xwk  [x] Continue per plan of care [] Alter current plan (see comments)  [] Plan of care initiated [] Hold pending MD visit [] Discharge    Electronically signed by: Jerald Bell

## 2019-04-15 NOTE — FLOWSHEET NOTE
[facility], [address] [place]  Phone: [phone]  Fax [phone]      ATHLETIC TRAINING [address]   Date:  4/15/2019    Patient Name:  Rosie Omer    :  1960  MRN: [id number]  Restrictions/Precautions:    Medical/Treatment Diagnosis Information:  ·   Diagnosis: R knee MMT s/p scope for PMM sx 3/8/19 S83.241D  · Treatment Diagnosis: R knee pain M25.561   Physician Information:  Dr. Gabriel Mcdonough Post-op  8 wks  12 wks 16 wks 20 wks   24 wks                            Activity Log                                                  DOS/DOI:                                                    Date: 04/02/19 04/05/19 04/08/19 4/10/19 4/15/19   ATC communication: , works on dump trucks, lifts, etc- a lot of lifting, squatting, crawling     First day back to work today- only a half day   Bike        Elliptical        Treadmill        Airdyne                Gastroc stretch        Soleus stretch        Hamstring stretch        ITB stretch        Hip Flexor stretch        Quad stretch        Adductor stretch                Weight Shifting sp                                  fp                                  tp        Lateral walking (with/w/o TB)                Balance: PEP/Gilda board                       SLS              Star excursion load/explode              Extremity reach UE/LE                Leg Press Yunior. 60# 3x12 80# 2x10 80# 3x10 80# 3x10 100# 2x10                     Ecc. 40# 3x12 60# 2x10 60# 3x10 60# 3x10 80# 2x10                     Inv. Calf Press Yunior.  60# 3x12 80# 2x10 80# 3x10 80# 3x10 100# 2x10                      Ecc.                            Inv.                CARLOS   Flex                   ABd 45# R/L 3x10 45# R/L 3x12 60# R/L 2x10 60# R/L 3x10 60# R/L 3x10              Add                  TKE 60# 30x5\" w/dorsiflexion 75# 20x5\" 75# 30x5\" 75# 30x5\" 75# 30x5\" Ext 45# R/L 3x10 45# R/L 3x12 60# R/L 2x10 60# R/L 3x10 60# R/L 3x10           Steps Up                   Up and Over                   Down                   Lateral                   Rotation                Squats  mini                      wall                     BOSU                Lunges:  Lunge to Balance                       Balance to Lunge                       Walking                Knee Extension Bilat. Ecc.                                   Inv. Hamstring Curls Bilat. 40# 2x10 40# 2x12 40# 3x10 40# 3x10 50# 2x10                              Ecc.                                   Inv.                Soleus Press Bilat. Ecc.                               Inv.                                         Ladders                    Square                   Jump/Hop  Low                          Med.                          High                                                                        Modality GR 15' PM/CP 15' PM/CP 13' PM/CP 13' PM/CP 13'   Initials                             EP EP EP JLW DTM   Time spent one on one (workers comp)        Time spent with PT assistant

## 2019-04-17 ENCOUNTER — HOSPITAL ENCOUNTER (OUTPATIENT)
Dept: PHYSICAL THERAPY | Age: 59
Setting detail: THERAPIES SERIES
Discharge: HOME | End: 2019-04-17
Payer: COMMERCIAL

## 2019-04-17 PROCEDURE — 97110 THERAPEUTIC EXERCISES: CPT | Performed by: PHYSICAL THERAPIST

## 2019-04-17 PROCEDURE — G0283 ELEC STIM OTHER THAN WOUND: HCPCS | Performed by: PHYSICAL THERAPIST

## 2019-04-17 PROCEDURE — 97112 NEUROMUSCULAR REEDUCATION: CPT | Performed by: PHYSICAL THERAPIST

## 2019-04-22 ENCOUNTER — HOSPITAL ENCOUNTER (OUTPATIENT)
Dept: PHYSICAL THERAPY | Age: 59
Setting detail: THERAPIES SERIES
Discharge: HOME | End: 2019-04-22
Payer: COMMERCIAL

## 2019-04-22 PROCEDURE — 97110 THERAPEUTIC EXERCISES: CPT

## 2019-04-22 PROCEDURE — 97112 NEUROMUSCULAR REEDUCATION: CPT

## 2019-04-22 PROCEDURE — G0283 ELEC STIM OTHER THAN WOUND: HCPCS

## 2019-04-22 NOTE — FLOWSHEET NOTE
[facility], [address]  Phone: [phone]  Fax [phone]    Physical Therapy Daily Treatment Note  Date:  2019    Patient Name:  Lisa Diallo    :  1960  MRN: [id number]  Restrictions/Precautions:    Medical/Treatment Diagnosis Information:  Diagnosis: R knee MMT s/p scope for PMM sx 3/8/19 S83.241D  Treatment Diagnosis: R knee pain B58.907   Insurance/Certification information:  PT Insurance Information: [facility]   Physician Information:  Referring Practitioner: Dr. Loly Mata of care signed (Y/N): sent; POC expires 19     Date of Patient follow up with Physician: 4/10/19     G-Code (if applicable):      Date G-Code Applied:  3/13/19 LEFS 94%  PT G-Codes  Functional Assessment Tool Used: LEFS  Score: 65%  Functional Limitation: Mobility: Walking and moving around    Progress Note: []  Yes  [x]  No  Next due by: Visit #10       Latex Allergy:  [x]NO      []YES  Preferred Language for Healthcare:   [x]English       []other:    Visit # Insurance Allowable Requires auth   12 12 through 5/3/19     []no        [x]yes:[facility]       Pain level:  2-6/10 R knee     SUBJECTIVE: Working 5 hours/day for two weeks and then will gradually wean into full time position w/ full duties. His boss has agreed to this schedule. Patient states that Dr. Prema Feldman suggested that he DC PT @ this time and continue with HEP. Patient was also instructed to return to Dr. Prema Feldman if his symptoms increase due to work. Still has pain in medial aspect of knee when first waking and walking up/down stairs to 3-4/10. After right knee warms up pain rating is 0-2/10. At work with lifting, bend in, stairs, pain rating can still increase to 5-6/10. OBJECTIVE: 6.5 weeks post op  See DC Note. Observation:Very good strength increase in quad motor control/strength since last measurements.    Test measurements: LEFS score same as visit #10.    RESTRICTIONS/PRECAUTIONS: DOI 7/11/18     Exercises/Interventions:     Therapeutic Ex Sets/sec Reps Notes   Bike 5 min     Gastroc Stretch on Incline H30x3     HS Stool Walk 2 laps     Trustretch rocks and hold 10R/ 60\"     Monster @ Half Wall fwd & BWD GTB @ Ankles  2 laps     Plank H15x6     SAQ Mat's Edge 7# H5 2x10     Supine Psoas Release w/ knee flex stretch w/ strap H30x3     SL hip Abd w/ ext press into wall 2.5# 3x10   +hep   Supine IT Band Stretch w/ strap H30x3     Prone Quad Set w/ shin press into ball H10x10  +hep   Prone Quad stretch w/ strap H30x3    +hep   Supine QS with SLR  3\" 2x10   Hep    SB bridge with SLR H5 2x10      See ATC sheet  See note  Started 3/21/19                           Manual Intervention                                         NMR re-education      Lateral Cable Walk w/ Tap Matrix 20#  1x10     Posterior Cable Walk w/ Tap Matrix 20# 1x10     BOSU FSU Knee Tap/Posterior Lunge 6\" 3x10     SLS w/ forward wt tap to step stool 3# 2x10      LSU w/ Hip Abd RTB 3x10     LSD 4\" 3x10 W/ mirror    BOSU Laterals 2x10         Therapeutic Exercise and NMR EXR  [x] (75534) Provided verbal/tactile cueing for activities related to strengthening, flexibility, endurance, ROM for improvements in LE, proximal hip, and core control with self care, mobility, lifting, ambulation.  [] (01392) Provided verbal/tactile cueing for activities related to improving balance, coordination, kinesthetic sense, posture, motor skill, proprioception  to assist with LE, proximal hip, and core control in self care, mobility, lifting, ambulation and eccentric single leg control.      NMR and Therapeutic Activities:    [x] (40196 or 49282) Provided verbal/tactile cueing for activities related to improving balance, coordination, kinesthetic sense, posture, motor skill, proprioception and motor activation to allow for proper function of core, proximal hip and LE with self care and ADLs  [x] (82949) Gait will have a decrease in pain to facilitate improvement in movement, function, and ADLs as indicated by Functional Deficits. Met    Long Term Goals: To be achieved in: 6-8 weeks  1. Disability index score of 35% or less for the LEFS to assist with reaching prior level of function. Decreased to 65% 4-  2. Patient will demonstrate increased AROM to R knee ext to 0° and flex to 130°  to allow for proper joint functioning as indicated by patients Functional Deficits. Met 3/21/19  3. Patient will demonstrate an increase in Strength to good proximal hip strength and control, within 5lb HHD in LE to allow for proper functional mobility as indicated by patients Functional Deficits. Approaching, but still hip abd and eccentric quad strength deficits 4-. 4. 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 Met 4-  5. Patient able to return to work full duty as  Returned 4 hr/day 4-    Progression Towards Functional goals:  [x] Patient is progressing as expected towards functional goals listed. [] Progression is slowed due to complexities listed. [] Progression has been slowed due to co-morbidities. [] Plan just implemented, too soon to assess goals progression  [x] Other: Pt has not met all LTG's but will DC PT and continue HEP as per Dr. Vikki Mera orders. ASSESSMENT: Pt has not met all LTG's but will DC PT and continue HEP as per Dr. Vikki Mera orders.     Treatment/Activity Tolerance:  [x] Patient tolerated treatment well [] Patient limited by fatique  [] Patient limited by pain  [] Patient limited by other medical complications  [] Other:     Prognosis: [x] Good [] Fair  [] Poor    Patient Requires Follow-up: [x] Yes  [] No    PLAN:   [] Continue per plan of care [] Alter current plan (see comments)  [] Plan of care initiated [] Hold pending MD visit [x] Discharge    Electronically signed by: Jerald Jameson

## 2019-04-22 NOTE — DISCHARGE SUMMARY
following goals were achieved:    1. Disability index score of 35% or less for the LEFS to assist with reaching prior level of function. Decreased to 65% 4-  2. Patient will demonstrate increased AROM to R knee ext to 0° and flex to 130°  to allow for proper joint functioning as indicated by patients Functional Deficits. Met 3/21/19  3. Patient will demonstrate an increase in Strength to good proximal hip strength and control, within 5lb HHD in LE to allow for proper functional mobility as indicated by patients Functional Deficits. Approaching, but still mild right quad deficit. 4. 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 Met 4-  5. Patient able to return to work full duty as  Returned 4 hr/day 4-.  [] The following goals were not achieved for the following reasons:/assessmen of improvement as it relates to each goal:    Plan of Care:  [x] Discharge from Therapy Services due to:Pt has not met all LTG's but will DC PT and continue HEP as per Dr. Unique Reilly orders. Reason for Discharge:   [] All goals achieved    [] Patient having surgery  [x] Physician discontinued therapy  [] Insurance/Financial Limitations [] Patient did not return for follow up visits [] Home program/1 visit only   [] No subjective or objective improvement [] Plateaued   [] Patient was unable to adhere to the plan of care established at evaluation. [] Referred back to physician for re-evaluation and did not return.      [] Other:?       Electronically signed by:  Eric Medrano

## 2019-04-22 NOTE — FLOWSHEET NOTE
[facility], [address] [place]  Phone: [phone]  Fax [phone]      ATHLETIC TRAINING [address]   Date:  2019    Patient Name:  Jasmine Garcia    :  1960  MRN: [id number]  Restrictions/Precautions:    Medical/Treatment Diagnosis Information:  ·   Diagnosis: R knee MMT s/p scope for PMM sx 3/8/19 S83.241D  · Treatment Diagnosis: R knee pain M25.561   Physician Information:  Dr. Baljinder Lees Post-op  8 wks  12 wks 16 wks 20 wks   24 wks                            Activity Log                                                  DOS/DOI:                                                    Date: 04/05/19 04/08/19 4/10/19 4/15/19 4/22/19   ATC communication: , works on dump trucks, lifts, etc- a lot of lifting, squatting, crawling    First day back to work today- only a half day    Bike        Elliptical        Treadmill        Airdyne                Gastroc stretch        Soleus stretch        Hamstring stretch        ITB stretch        Hip Flexor stretch        Quad stretch        Adductor stretch                Weight Shifting sp                                  fp                                  tp        Lateral walking (with/w/o TB)                Balance: PEP/Gilda board                       SLS              Star excursion load/explode              Extremity reach UE/LE                Leg Press Yunior. 80# 2x10 80# 3x10 80# 3x10 100# 2x10 100# 3x10                     Ecc. 60# 2x10 60# 3x10 60# 3x10 80# 2x10 80# 3x10                     Inv. Calf Press Yunior.  80# 2x10 80# 3x10 80# 3x10 100# 2x10 100# 3x10                      Ecc.                            Inv.                CARLOS   Flex                   ABd 45# R/L 3x12 60# R/L 2x10 60# R/L 3x10 60# R/L 3x10 60# R/L 3x12              Add                  TKE 75# 20x5\" 75# 30x5\" 75# 30x5\" 75# 30x5\" 75# 30x5\"              Ext 45# R/L 3x12 60# R/L 2x10 60# R/L 3x10 60# R/L 3x10 60# R/L 3x12           Steps Up                   Up and Over                   Down                   Lateral                   Rotation                Squats  mini                      wall                     BOSU                Lunges:  Lunge to Balance                       Balance to Lunge                       Walking                Knee Extension Bilat. Ecc.                                   Inv. Hamstring Curls Bilat. 40# 2x12 40# 3x10 40# 3x10 50# 2x10 50# 3x10                              Ecc.                                   Inv.                Soleus Press Bilat. Ecc.                               Inv.                                         Ladders                    Square                   Jump/Hop  Low                          Med.                          High                                                                        Modality PM/CP 15' PM/CP 15' PM/CP 13' PM/CP 13' PM/CP 13'   Initials                             EP EP JLW DTM DTM   Time spent one on one (workers comp)        Time spent with PT assistant

## 2019-04-25 ENCOUNTER — APPOINTMENT (OUTPATIENT)
Dept: PHYSICAL THERAPY | Age: 59
End: 2019-04-25
Payer: COMMERCIAL

## 2019-05-01 ENCOUNTER — TELEPHONE (OUTPATIENT)
Dept: ORTHOPEDIC SURGERY | Age: 59
End: 2019-05-01

## 2019-12-16 ENCOUNTER — OFFICE VISIT (OUTPATIENT)
Dept: ORTHOPEDIC SURGERY | Age: 59
End: 2019-12-16
Payer: COMMERCIAL

## 2019-12-16 VITALS — HEIGHT: 70 IN | BODY MASS INDEX: 24.48 KG/M2 | WEIGHT: 171 LBS

## 2019-12-16 DIAGNOSIS — M17.12 PRIMARY OSTEOARTHRITIS OF LEFT KNEE: Primary | ICD-10-CM

## 2019-12-16 PROCEDURE — 99213 OFFICE O/P EST LOW 20 MIN: CPT | Performed by: ORTHOPAEDIC SURGERY

## 2019-12-19 ENCOUNTER — OFFICE VISIT (OUTPATIENT)
Dept: ORTHOPEDIC SURGERY | Age: 59
End: 2019-12-19
Payer: COMMERCIAL

## 2019-12-19 DIAGNOSIS — S83.241A TEAR OF MEDIAL MENISCUS OF RIGHT KNEE, UNSPECIFIED TEAR TYPE, UNSPECIFIED WHETHER OLD OR CURRENT TEAR, INITIAL ENCOUNTER: Primary | ICD-10-CM

## 2019-12-19 PROCEDURE — 99213 OFFICE O/P EST LOW 20 MIN: CPT | Performed by: ORTHOPAEDIC SURGERY

## 2020-02-13 ENCOUNTER — OFFICE VISIT (OUTPATIENT)
Dept: ORTHOPEDIC SURGERY | Age: 60
End: 2020-02-13
Payer: COMMERCIAL

## 2020-02-13 PROCEDURE — 99214 OFFICE O/P EST MOD 30 MIN: CPT | Performed by: PHYSICIAN ASSISTANT

## 2020-02-13 NOTE — LETTER
performance of any surgical or medical procedure(s). I am aware that the practice of surgery and medicine is not an exact science, and I acknowledge that no guarantees have been made to me concerning the results of the procedure(s). 5) I consent to the taking of photographs before, during and after the procedure(s) for scientific and educational purposes. I also understand that medical students and residents may participate in the procedure(s) set forth in Paragraph 1, and I consent to their participation under the supervision of the above named physician. 6) I consent to the administration of anesthesia and to the use of such anesthetics as may be deemed advisable by the anesthesiologist engaged to administer anesthesia. 7) I certify that I have read and understand this consent to the surgical or medical procedure(s); that all the information contained herein was disclosed to me by the informing physician prior to my signing; that all blanks or statements requiring insertions or completion were filled in and inapplicable paragraphs, if any, were stricken before I signed; and that all questions asked by me about the procedure(s) have been fully answered by the informing physician in a satisfactory manner.     Would you like to schedule an appointment with Dr. Carley Butler prior to surgery:   YES  / NO  ______Initial    ________________________________                           _______________________________  Signature of patient                                                                  Witness           Stella Chavarria PA-C / Ethyl Leiter, PA-C Garrel Sicard, M.D.   ________________________________                           ________________________________  Informing Physician Assistant                                                                 Physician (Print)    If patient is unable to sign or is a minor, complete one of the following: Once you have completed both the labs and the Physical Therapy evaluation please call Francisco Calvo @ [phone] to let her know. Once verification of the PT Evaluation and completed labs has been determined you will be called and set up for surgery. This may take 1-2 days to check results and return your phone call. You will not be called about lab results if everything is normal.    Please make sure you have not blocked our number. Francisco Calvo will call from [phone] / [phone]. Also, please make sure your voice mail is not full.

## 2020-02-13 NOTE — PROGRESS NOTES
Narrative    None     Current Outpatient Medications   Medication Sig Dispense Refill    ibuprofen (ADVIL;MOTRIN) 200 MG tablet Take 200 mg by mouth every 6 hours as needed for Pain       No current facility-administered medications for this visit. Review of Systems:  Relevant review of systems reviewed and available in the patient's chart    Vital Signs: There were no vitals taken for this visit. General Exam:   Constitutional: Patient is adequately groomed with no evidence of malnutrition  DTRs: Deep tendon reflexes are intact  Mental Status: The patient is oriented to time, place and person. The patient's mood and affect are appropriate. Lymphatic: The lymphatic examination bilaterally reveals all areas to be without enlargement or induration. Vascular: Examination reveals no swelling or calf tenderness. Peripheral pulses are palpable and 2+. Neurological: The patient has good coordination. There is no weakness or sensory deficit. There is no height or weight on file to calculate BMI. Right knee Examination:    Inspection:  No swelling, ecchymosis or deformity    Palpation:  Tenderness to palpation directly over the medial joint line    Range of Motion:  Well-preserved range of motion, 0-120°. Strength:  4+/5 quad and hamstring strength    Special Tests:  Positive Celeste's examination. Stable to varus and valgus stress testing. Negative Lachman's exam    Skin: There are no rashes, ulcerations or lesions. Gait: Mild antalgic gait    Reflex normal deep tendon reflexes    Additional Comments:       Examination of the left hip reveals intact skin. The patient demonstrates full painless range of motion with regards to flexion, abduction, internal and external rotation. There is no tenderness about the greater trochanter. There is a negative straight leg raise against resistance. Strength is 5/5 throughout all planes.     Radiology:     X-rays obtained previously and reviewed in office:  Views 4 views including AP weightbearing, PA flexed weightbearing, lateral, and skyline  Location right knee  Impression mild thinning of the medial joint surface. No evidence of fractures or dislocations. [id number] #: [id number] Procedure: MR Right Knee w/o Contrast ; Reason for Exam: Medial meniscus tear. This document is confidential medical information.  Unauthorized disclosure or use of this information is prohibited by law. If you are not the intended recipient of this document, please advise us by calling immediately [phone].       [facility] [address]           Patient Name: Ray Perry   Case ID: [id number]   Patient : 1960   Referring Physician: Chanell Leija MD   Exam Date: 2019   Exam Description: MR Right Knee w/o Contrast            HISTORY:  History of surgery, medial pain.       TECHNICAL FACTORS:  Long- and short-axis fat- and water-weighted images were performed. 1.5T    High Field Oval.       COMPARISON:  2018.       FINDINGS:  Radial and horizontal tears involve the posterior horn of the medial meniscus.  No    lateral meniscal tear is identified.  A small knee joint effusion is observed. Saray Dmitri is no    evidence of Baker's cyst.       The anterior and posterior cruciate ligaments appear intact.  The distal quadriceps tendon and    patellar tendon are intact.  Mild edema of the superior portion of the infrapatellar fat pad is    identified.  Medial collateral ligament is intact.  The fibular collateral ligament, biceps    femoris tendon, and iliotibial band appear intact.       CONCLUSION:   1. Radial and horizontal tears involving the posterior horn of the medial meniscus, similar in    pattern when compared with the prior study dated 2018.   2. Small knee joint effusion, decreasing in volume. 3. 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 Impression:  Encounter Diagnosis   Name Primary?  Tear of medial meniscus of right knee, unspecified tear type, unspecified whether old or current tear, initial encounter Yes       Office Procedures:  No orders of the defined types were placed in this encounter. Treatment Plan: Patient and I have reviewed his treatment options. We will proceed with a right knee video arthroscopy with partial medial meniscectomy. We discussed the risks, benefits, and complications of arthroscopic knee surgery. The patient realizes that there are concerns with this surgery with respect to infection, deep vein thrombosis, neurological injury, delayed  rehabilitation, the possibility of arthrofibrosis of the knee, and specifically  Hoffa's fat pad fibrosis that can potentially cause difficulties. The patient realizes that there are also anesthetic concerns including cardiopulmonary issues, pulmonary issues, and even possibility of death or dystrophy. The patient voiced understanding to this as well as the normal  rehabilitation  that   is involved with weeks of physical therapy, exercise, and strengthening. The patient also realizes that even though the surgery, from a functional perspective, typically allows the patient to return to good function at about 6 weeks, that it often takes 6 months to completely rehabilitate from this operation. The patient also realizes that if there is an arthritic component to the symptoms, then they may still have some degree of arthritis pain.

## 2020-03-05 NOTE — PROGRESS NOTES
[facility]    Age 61 y[facility]    male    1960    MRN [id number]    3/13/2020  Arrival Time_____________  OR Time____________[address]     Procedure(s):  VIDEO ARTHROSCOPY RIGHT KNEE, PARTIAL MEDIAL MENISCECTOMY, CHONDROPLASTY                      General    Surgeon(s):  Roger Cerna, MD       Phone [phone] (home) [phone] (work)    [address] [place] [address]  Cell Work  _________________________________________________________________  _________________________________________________________________  _________________________________________________________________  _________________________________________________________________  _________________________________________________________________      PCP _____________________________ Phone_________________       H&P__________________Bringing    Chart            Epic  DOS     Called_______  EKG__________________Bringing    Chart            Epic  DOS     Called_______  LAB__________________ Bringing    Chart            Epic  DOS     Called_______  Cardiac Clearance_______Bringing    Chart            Epic      DOS       Called_______    Cardiologist________________________ Phone___________________________      ? Restoration concerns / Waiver on Chart            PAT Communications_____________  ? Pre-op Instructions Given [place]          ______________________________  ? Directions to Surgery Center                          ______________________________  ? Transportation Home_______________      _______________________________  ?  Crutches/Walker__________________        _______________________________      ________Pre-op Orders   _______Transcribed    _______Wt.  ________Pharmacy          _______SCD  ______VTE     ______Beta Blocker  ________Consent             ________TED Kam [place]

## 2020-03-06 NOTE — PROGRESS NOTES
Patient instructed to:  Bring picture ID, insurance card,proof of address  Dress in comfortable,loose clothing,no jewelry, no make up/or finger polish/nocontact lenses dos if applicable  ALL Sanaz Churn on operative limb must be removed prior to DOS -if applicable  Nothing to eat or drink after midnight the night before surgery   If instructed to take medicines day of surgery by PCP, take only with sips of water  If you are taking insulin or diabetic medications check with your PCP to adjust doses according to your NPO (not eating) status  Arrange for transportation to and from surgery  With a caregiver staying with you for 24 hours  --make sure you are bring appropriate clothes to fit over large operative drsg - if applicable  Bring copies of H&P and or ekg dos     If your are taking NSAIDS/ASA products/vitamins regularly confirm this with your surgeon regarding taking them preop 5 days before surgery     Notify your Surgeon if you develop any illness between now and surgery time; cough, cold, fever, sore throat, nausea, vomiting, etc.

## 2020-03-06 NOTE — PROGRESS NOTES
Obstructive Sleep Apnea (PAOLA) Screening     Patient:  Lissy Ragland    YOB: 1960      Medical Record #:  [id number]                     Date:  3/6/2020     1. Are you a loud and/or regular snorer? [x]  Yes       [] No    2. Have you been observed to gasp or stop breathing during sleep? []  Yes       [x] No    3. Do you feel tired or groggy upon awakening or do you awaken with a headache?           []  Yes       [x] No    4. Are you often tired or fatigued during the wake time hours? []  Yes       [x] No    5. Do you fall asleep sitting, reading, watching TV or driving? []  Yes       [x] No    6. Do you often have problems with memory or concentration? []  Yes       [x] No    **If patient's score is ? 3 they are considered high risk for PAOLA. An Anesthesia provider will evaluate the patient and develop a plan of care the day of surgery. Note:  If the patient's BMI is more than 35 kg m¯² , has neck circumference > 40 cm, and/or high blood pressure the risk is greater (© American Sleep Apnea Association, 2006).

## 2020-03-13 ENCOUNTER — ANESTHESIA (OUTPATIENT)
Dept: OPERATING ROOM | Age: 60
End: 2020-03-13
Payer: COMMERCIAL

## 2020-03-13 ENCOUNTER — ANESTHESIA EVENT (OUTPATIENT)
Dept: OPERATING ROOM | Age: 60
End: 2020-03-13
Payer: COMMERCIAL

## 2020-03-13 ENCOUNTER — HOSPITAL ENCOUNTER (OUTPATIENT)
Age: 60
Setting detail: OUTPATIENT SURGERY
Discharge: HOME | End: 2020-03-13
Attending: ORTHOPAEDIC SURGERY | Admitting: ORTHOPAEDIC SURGERY
Payer: COMMERCIAL

## 2020-03-13 VITALS
WEIGHT: 171 LBS | TEMPERATURE: 97.2 F | HEART RATE: 74 BPM | OXYGEN SATURATION: 97 % | RESPIRATION RATE: 18 BRPM | BODY MASS INDEX: 25.33 KG/M2 | DIASTOLIC BLOOD PRESSURE: 84 MMHG | SYSTOLIC BLOOD PRESSURE: 157 MMHG | HEIGHT: 69 IN

## 2020-03-13 VITALS
SYSTOLIC BLOOD PRESSURE: 117 MMHG | DIASTOLIC BLOOD PRESSURE: 61 MMHG | RESPIRATION RATE: 8 BRPM | OXYGEN SATURATION: 93 %

## 2020-03-13 PROCEDURE — 2580000003 HC RX 258: Performed by: ORTHOPAEDIC SURGERY

## 2020-03-13 PROCEDURE — 2709999900 HC NON-CHARGEABLE SUPPLY: Performed by: ORTHOPAEDIC SURGERY

## 2020-03-13 PROCEDURE — 3700000000 HC ANESTHESIA ATTENDED CARE: Performed by: ORTHOPAEDIC SURGERY

## 2020-03-13 PROCEDURE — 7100000010 HC PHASE II RECOVERY - FIRST 15 MIN: Performed by: ORTHOPAEDIC SURGERY

## 2020-03-13 PROCEDURE — 2580000003 HC RX 258: Performed by: NURSE ANESTHETIST, CERTIFIED REGISTERED

## 2020-03-13 PROCEDURE — 2500000003 HC RX 250 WO HCPCS: Performed by: ORTHOPAEDIC SURGERY

## 2020-03-13 PROCEDURE — 2500000003 HC RX 250 WO HCPCS: Performed by: NURSE ANESTHETIST, CERTIFIED REGISTERED

## 2020-03-13 PROCEDURE — 7100000001 HC PACU RECOVERY - ADDTL 15 MIN: Performed by: ORTHOPAEDIC SURGERY

## 2020-03-13 PROCEDURE — 3600000014 HC SURGERY LEVEL 4 ADDTL 15MIN: Performed by: ORTHOPAEDIC SURGERY

## 2020-03-13 PROCEDURE — 3700000001 HC ADD 15 MINUTES (ANESTHESIA): Performed by: ORTHOPAEDIC SURGERY

## 2020-03-13 PROCEDURE — 7100000000 HC PACU RECOVERY - FIRST 15 MIN: Performed by: ORTHOPAEDIC SURGERY

## 2020-03-13 PROCEDURE — 6360000002 HC RX W HCPCS: Performed by: NURSE ANESTHETIST, CERTIFIED REGISTERED

## 2020-03-13 PROCEDURE — 7100000011 HC PHASE II RECOVERY - ADDTL 15 MIN: Performed by: ORTHOPAEDIC SURGERY

## 2020-03-13 PROCEDURE — 2580000003 HC RX 258: Performed by: ANESTHESIOLOGY

## 2020-03-13 PROCEDURE — 3600000004 HC SURGERY LEVEL 4 BASE: Performed by: ORTHOPAEDIC SURGERY

## 2020-03-13 RX ORDER — BUPIVACAINE HYDROCHLORIDE 2.5 MG/ML
INJECTION, SOLUTION INFILTRATION; PERINEURAL PRN
Status: DISCONTINUED | OUTPATIENT
Start: 2020-03-13 | End: 2020-03-13 | Stop reason: ALTCHOICE

## 2020-03-13 RX ORDER — MIDAZOLAM HYDROCHLORIDE 1 MG/ML
INJECTION INTRAMUSCULAR; INTRAVENOUS PRN
Status: DISCONTINUED | OUTPATIENT
Start: 2020-03-13 | End: 2020-03-13 | Stop reason: SDUPTHER

## 2020-03-13 RX ORDER — MEPERIDINE HYDROCHLORIDE 50 MG/ML
12.5 INJECTION INTRAMUSCULAR; INTRAVENOUS; SUBCUTANEOUS EVERY 5 MIN PRN
Status: DISCONTINUED | OUTPATIENT
Start: 2020-03-13 | End: 2020-03-13 | Stop reason: HOSPADM

## 2020-03-13 RX ORDER — DIPHENHYDRAMINE HYDROCHLORIDE 50 MG/ML
6.25 INJECTION INTRAMUSCULAR; INTRAVENOUS
Status: DISCONTINUED | OUTPATIENT
Start: 2020-03-13 | End: 2020-03-13 | Stop reason: HOSPADM

## 2020-03-13 RX ORDER — ONDANSETRON 2 MG/ML
4 INJECTION INTRAMUSCULAR; INTRAVENOUS EVERY 30 MIN PRN
Status: DISCONTINUED | OUTPATIENT
Start: 2020-03-13 | End: 2020-03-13 | Stop reason: HOSPADM

## 2020-03-13 RX ORDER — LIDOCAINE HYDROCHLORIDE 10 MG/ML
2 INJECTION, SOLUTION INFILTRATION; PERINEURAL
Status: DISCONTINUED | OUTPATIENT
Start: 2020-03-13 | End: 2020-03-13 | Stop reason: HOSPADM

## 2020-03-13 RX ORDER — LIDOCAINE HYDROCHLORIDE 20 MG/ML
INJECTION, SOLUTION INFILTRATION; PERINEURAL PRN
Status: DISCONTINUED | OUTPATIENT
Start: 2020-03-13 | End: 2020-03-13 | Stop reason: SDUPTHER

## 2020-03-13 RX ORDER — LABETALOL HYDROCHLORIDE 5 MG/ML
5 INJECTION, SOLUTION INTRAVENOUS
Status: DISCONTINUED | OUTPATIENT
Start: 2020-03-13 | End: 2020-03-13 | Stop reason: HOSPADM

## 2020-03-13 RX ORDER — OXYCODONE HYDROCHLORIDE AND ACETAMINOPHEN 5; 325 MG/1; MG/1
2 TABLET ORAL PRN
Status: DISCONTINUED | OUTPATIENT
Start: 2020-03-13 | End: 2020-03-13 | Stop reason: HOSPADM

## 2020-03-13 RX ORDER — HYDROCODONE BITARTRATE AND ACETAMINOPHEN 5; 325 MG/1; MG/1
1 TABLET ORAL EVERY 6 HOURS PRN
Qty: 28 TABLET | Refills: 0 | Status: SHIPPED | OUTPATIENT
Start: 2020-03-13 | End: 2020-03-20

## 2020-03-13 RX ORDER — PROPOFOL 10 MG/ML
INJECTION, EMULSION INTRAVENOUS PRN
Status: DISCONTINUED | OUTPATIENT
Start: 2020-03-13 | End: 2020-03-13 | Stop reason: SDUPTHER

## 2020-03-13 RX ORDER — SODIUM CHLORIDE, SODIUM LACTATE, POTASSIUM CHLORIDE, CALCIUM CHLORIDE 600; 310; 30; 20 MG/100ML; MG/100ML; MG/100ML; MG/100ML
INJECTION, SOLUTION INTRAVENOUS CONTINUOUS PRN
Status: DISCONTINUED | OUTPATIENT
Start: 2020-03-13 | End: 2020-03-13 | Stop reason: SDUPTHER

## 2020-03-13 RX ORDER — ONDANSETRON 2 MG/ML
INJECTION INTRAMUSCULAR; INTRAVENOUS PRN
Status: DISCONTINUED | OUTPATIENT
Start: 2020-03-13 | End: 2020-03-13 | Stop reason: SDUPTHER

## 2020-03-13 RX ORDER — GLYCOPYRROLATE 0.2 MG/ML
INJECTION INTRAMUSCULAR; INTRAVENOUS PRN
Status: DISCONTINUED | OUTPATIENT
Start: 2020-03-13 | End: 2020-03-13 | Stop reason: SDUPTHER

## 2020-03-13 RX ORDER — SODIUM CHLORIDE, SODIUM LACTATE, POTASSIUM CHLORIDE, AND CALCIUM CHLORIDE .6; .31; .03; .02 G/100ML; G/100ML; G/100ML; G/100ML
IRRIGANT IRRIGATION PRN
Status: DISCONTINUED | OUTPATIENT
Start: 2020-03-13 | End: 2020-03-13 | Stop reason: ALTCHOICE

## 2020-03-13 RX ORDER — SODIUM CHLORIDE, SODIUM LACTATE, POTASSIUM CHLORIDE, CALCIUM CHLORIDE 600; 310; 30; 20 MG/100ML; MG/100ML; MG/100ML; MG/100ML
INJECTION, SOLUTION INTRAVENOUS CONTINUOUS
Status: DISCONTINUED | OUTPATIENT
Start: 2020-03-13 | End: 2020-03-13 | Stop reason: HOSPADM

## 2020-03-13 RX ORDER — HYDRALAZINE HYDROCHLORIDE 20 MG/ML
5 INJECTION INTRAMUSCULAR; INTRAVENOUS EVERY 30 MIN PRN
Status: DISCONTINUED | OUTPATIENT
Start: 2020-03-13 | End: 2020-03-13 | Stop reason: HOSPADM

## 2020-03-13 RX ORDER — FENTANYL CITRATE 50 UG/ML
INJECTION, SOLUTION INTRAMUSCULAR; INTRAVENOUS PRN
Status: DISCONTINUED | OUTPATIENT
Start: 2020-03-13 | End: 2020-03-13 | Stop reason: SDUPTHER

## 2020-03-13 RX ORDER — OXYCODONE HYDROCHLORIDE AND ACETAMINOPHEN 5; 325 MG/1; MG/1
1 TABLET ORAL PRN
Status: DISCONTINUED | OUTPATIENT
Start: 2020-03-13 | End: 2020-03-13 | Stop reason: HOSPADM

## 2020-03-13 RX ORDER — DEXAMETHASONE SODIUM PHOSPHATE 4 MG/ML
INJECTION, SOLUTION INTRA-ARTICULAR; INTRALESIONAL; INTRAMUSCULAR; INTRAVENOUS; SOFT TISSUE PRN
Status: DISCONTINUED | OUTPATIENT
Start: 2020-03-13 | End: 2020-03-13 | Stop reason: SDUPTHER

## 2020-03-13 RX ADMIN — PROPOFOL 200 MG: 10 INJECTION, EMULSION INTRAVENOUS at 14:11

## 2020-03-13 RX ADMIN — SODIUM CHLORIDE, POTASSIUM CHLORIDE, SODIUM LACTATE AND CALCIUM CHLORIDE: 600; 310; 30; 20 INJECTION, SOLUTION INTRAVENOUS at 14:06

## 2020-03-13 RX ADMIN — SODIUM CHLORIDE, POTASSIUM CHLORIDE, SODIUM LACTATE AND CALCIUM CHLORIDE: 600; 310; 30; 20 INJECTION, SOLUTION INTRAVENOUS at 13:52

## 2020-03-13 RX ADMIN — ONDANSETRON 4 MG: 2 INJECTION INTRAMUSCULAR; INTRAVENOUS at 14:17

## 2020-03-13 RX ADMIN — CEFAZOLIN 2 G: 1 INJECTION, POWDER, FOR SOLUTION INTRAMUSCULAR; INTRAVENOUS at 14:15

## 2020-03-13 RX ADMIN — DEXAMETHASONE SODIUM PHOSPHATE 10 MG: 4 INJECTION, SOLUTION INTRAMUSCULAR; INTRAVENOUS at 14:17

## 2020-03-13 RX ADMIN — MIDAZOLAM HYDROCHLORIDE 2 MG: 2 INJECTION, SOLUTION INTRAMUSCULAR; INTRAVENOUS at 14:05

## 2020-03-13 RX ADMIN — GLYCOPYRROLATE 0.2 MG: 0.2 INJECTION, SOLUTION INTRAMUSCULAR; INTRAVENOUS at 14:39

## 2020-03-13 RX ADMIN — FENTANYL CITRATE 25 MCG: 50 INJECTION INTRAMUSCULAR; INTRAVENOUS at 14:19

## 2020-03-13 RX ADMIN — FENTANYL CITRATE 25 MCG: 50 INJECTION INTRAMUSCULAR; INTRAVENOUS at 14:11

## 2020-03-13 RX ADMIN — LIDOCAINE HYDROCHLORIDE 50 MG: 20 INJECTION, SOLUTION INFILTRATION; PERINEURAL at 14:11

## 2020-03-13 ASSESSMENT — PULMONARY FUNCTION TESTS
PIF_VALUE: 3
PIF_VALUE: 10
PIF_VALUE: 2
PIF_VALUE: 2
PIF_VALUE: 4
PIF_VALUE: 2
PIF_VALUE: 18
PIF_VALUE: 3
PIF_VALUE: 2
PIF_VALUE: 2
PIF_VALUE: 10
PIF_VALUE: 10
PIF_VALUE: 2
PIF_VALUE: 10
PIF_VALUE: 2
PIF_VALUE: 10
PIF_VALUE: 2
PIF_VALUE: 10
PIF_VALUE: 4
PIF_VALUE: 4
PIF_VALUE: 15
PIF_VALUE: 2
PIF_VALUE: 2
PIF_VALUE: 3
PIF_VALUE: 2
PIF_VALUE: 10
PIF_VALUE: 13

## 2020-03-13 ASSESSMENT — PAIN - FUNCTIONAL ASSESSMENT: PAIN_FUNCTIONAL_ASSESSMENT: 0-10

## 2020-03-13 ASSESSMENT — PAIN SCALES - GENERAL
PAINLEVEL_OUTOF10: 0

## 2020-03-13 NOTE — ANESTHESIA PRE PROCEDURE
Department of Anesthesiology  Preprocedure Note       Name:  Perry Ramirez   Age:  61 y.o.  :  1960                                          MRN:  [id number]         Date:  3/13/2020      Surgeon: Casa Gil):  Nomi Hogan MD    Procedure: VIDEO ARTHROSCOPY RIGHT KNEE, PARTIAL MEDIAL MENISCECTOMY, CHONDROPLASTY (Right Knee)    Medications prior to admission:   Prior to Admission medications    Medication Sig Start Date End Date Taking?  Authorizing Provider   Omega-3 Fatty Acids (OMEGA-3 FISH OIL PO) Take by mouth   Yes Historical Provider, MD   ibuprofen (ADVIL;MOTRIN) 200 MG tablet Take 200 mg by mouth every 6 hours as needed for Pain   Yes Historical Provider, MD       Current medications:    Current Facility-Administered Medications   Medication Dose Route Frequency Provider Last Rate Last Dose    ceFAZolin (ANCEF) 2 g in dextrose 5 % 100 mL IVPB  2 g Intravenous On Call to [address], MD        lidocaine 1 % injection 2 mL  2 mL Intradermal Once PRN Gustavo Ortega MD        lactated ringers infusion   Intravenous Continuous Gustavo Ortega MD        HYDROmorphone (DILAUDID) injection 0.5 mg  0.5 mg Intravenous Q10 Min PRN Gustavo Ortega MD        HYDROmorphone (DILAUDID) injection 0.5 mg  0.5 mg Intravenous Q5 Min PRN Gustavo Ortega MD        oxyCODONE-acetaminophen (PERCOCET) 5-325 MG per tablet 1 tablet  1 tablet Oral PRN Gustavo Ortega MD        Or    oxyCODONE-acetaminophen (PERCOCET) 5-325 MG per tablet 2 tablet  2 tablet Oral PRN Gustavo Ortega MD        diphenhydrAMINE (BENADRYL) injection 6.25 mg  6.25 mg Intravenous Once PRN Gustavo Ortega MD        ondansetron [facility]) injection 4 mg  4 mg Intravenous Q30 Min PRN Gustavo Ortega MD        labetalol (NORMODYNE;TRANDATE) injection 5 mg  5 mg Intravenous Q15 Min PRN Gustavo Ortega MD        hydrALAZINE (APRESOLINE) injection 5 mg  5 mg Intravenous Q30 Min PRN Gustavo Ortega

## 2020-03-13 NOTE — BRIEF OP NOTE
Brief Postoperative Note  ______________________________________________________________    Patient: Efra Diehl  YOB: 1960  MRN: [id number]  Date of Procedure: 3/13/2020    Pre-Op Diagnosis: RIGHT KNEE MEDIAL  MENISCUS TEAR; CMP; MULTIPLE LOOSE BODIES    Post-Op Diagnosis: Same       Procedure(s):  VIDEO ARTHROSCOPY RIGHT KNEE, PARTIAL MEDIAL MENISCECTOMY, CHONDROPLASTY, REMOVAL OF LOOSE BODIES    Anesthesia: General    Surgeon(s):  Windell Babinski, MD    Estimated Blood Loss (mL): less than 50     Complications: None    Specimens:   * No specimens in log *    Implants:  * No implants in log *      Drains: * No LDAs found *    Findings: AS ABOVE    Windell Babinski, MD  Date: 3/13/2020  Time: 2:47 PM

## 2020-03-13 NOTE — ANESTHESIA POSTPROCEDURE EVALUATION
Department of Anesthesiology  Postprocedure Note    Patient: Rachael Ken  MRN: [id number]  YOB: 1960  Date of evaluation: 3/13/2020    Procedure Summary     Date:  03/13/20 Room / Location:  [address] [place] [address]    Anesthesia Start:  1406 Anesthesia Stop:  7580    Procedure:  VIDEO ARTHROSCOPY RIGHT KNEE, PARTIAL MEDIAL MENISCECTOMY, CHONDROPLASTY, REMOVAL OF LOOSE BODIES (Right Knee) Diagnosis:       Tear of meniscus of right knee, unspecified meniscus, unspecified tear type, unspecified whether old or current tear      (RIGHT KNEE MENISCUS TEAR)    Surgeon:  Wayne Cr MD Responsible Provider:      Anesthesia Type:  general ASA Status:  1        Anesthesia Type: general    Becca Phase I: Becca Score: 9    Becca Phase II: Becca Score: 10    Last vitals: Reviewed and per EMR flowsheets.      Anesthesia Post Evaluation   Anesthetic Problems: no   Cardiovascular System Stable: yes  Respiratory Function: Airway Patent yes  ETT no  Ventilator no  Level of consciousness: awake, alert and oriented  Post-op pain: adequate analgesia  Hydration Adequate: yes  Nausea/Vomiting:no  Other Issues:     Jose Haro MD

## 2020-03-14 NOTE — OP NOTE
[address], [place] [address]                                OPERATIVE REPORT    PATIENT NAME: Brandin Carlin                      :        1960  MED REC NO:   [id number]                          ROOM:  ACCOUNT NO:   [de-identified]                           ADMIT DATE: 2020  PROVIDER:     Sebas Rome MD    DATE OF PROCEDURE:  2020    PREOPERATIVE DIAGNOSIS:  Right knee medial meniscus tear, chondromalacia  of the patella, multiple loose bodies. POSTOPERATIVE DIAGNOSIS:  Right knee medial meniscus tear,  chondromalacia of the patella, multiple loose bodies. OPERATION PERFORMED:  Right knee diagnostic video arthroscopy,  arthroscopic partial medial meniscectomy, chondroplasty of the  patellofemoral joint and medial compartment, removal of multiple loose  bodies. PRIMARY SURGEON:  Sebas Rome MD    ANESTHESIA:  General.    COMPLICATIONS:  None apparent. EBL: < 10 cc    POSTOPERATIVE CONDITION:  Recovery room. INDICATIONS FOR THE SURGERY:  The patient is a 63-year-old white male  who has had a long history of right knee pain, refractory to  conservative management, affecting his activities of daily living. He  was appraised of risks, benefits, and alternatives of surgery. All  questions were answered. The patient wished to proceed with surgery. Limitations of surgery also clearly discussed especially in light of  preexisting chondral changes. He agreed with these and wished to  proceed with surgery. DETAILS OF THE PROCEDURE:  The patient was brought to the operating  room. After administration of general anesthesia, the patient was  placed on the OR table in a standard arthroscopic position. Right lower  extremity prepped and draped in a normal sterile fashion. Limb was  exsanguinated. Tourniquet was inflated to 250 mmHg. Standard  two-portal arthroscopy was performed.   The following were the findings:    1. THE PATELLOFEMORAL JOINT:  The patellofemoral joint had grade 3  chondral changes in the median ridge of the patella for which  chondroplasty was performed debriding loose cartilages back to a stable  rim. There were multiple loose bodies contained in the suprapatellar  pouch, medial and lateral gutters. These were removed using a 4.0  shaver. There was some synovitis anteriorly, which was excised. 2.  THE MEDIAL COMPARTMENT:  The medial compartment had a very small  radial tear of the posterior horn. This was trimmed back to a stable  rim using a combination of baskets, 4.0 shaver sculpting the meniscus  tear back to a stable rim. The vast majority of the meniscus, however,  was intact. There were some grade 3 chondral changes throughout the  medial femoral condyle for which a chondroplasty was performed debriding  loose cartilages back to a stable rim. Again, there were multiple loose  bodies contained within the medial compartment. These loose bodies were  removed using a 4.0 shaver. 3.  THE NOTCH:  The notch had an intact ACL and PCL with no evidence of  ligamentous instability. 4.  THE LATERAL COMPARTMENT:  The lateral compartment had intact lateral  meniscus with no evidence of meniscal pathology. Again, multiple loose  bodies were removed using a 4.0 shaver. The cartilage surfaces were  intact both on the femoral and tibial side. The knee was then irrigated with copious amounts of irrigation solution. Portals were closed using 4-0 Monocryl suture. Dry sterile compression  dressing was applied. The patient was then taken to the recovery room  in stable condition, having tolerated the procedure well.         Kita Sanchez MD    D: 03/13/2020 15:24:07       T: 03/14/2020 0:41:19     [email or web address]  Job#: [id number]     Doc#: [id number]    CC:  River Romero

## 2020-03-17 ENCOUNTER — OFFICE VISIT (OUTPATIENT)
Dept: ORTHOPEDIC SURGERY | Age: 60
End: 2020-03-17

## 2020-03-17 ENCOUNTER — HOSPITAL ENCOUNTER (OUTPATIENT)
Dept: PHYSICAL THERAPY | Age: 60
Setting detail: THERAPIES SERIES
Discharge: HOME | End: 2020-03-17
Payer: COMMERCIAL

## 2020-03-17 PROCEDURE — 97110 THERAPEUTIC EXERCISES: CPT

## 2020-03-17 PROCEDURE — 99024 POSTOP FOLLOW-UP VISIT: CPT | Performed by: PHYSICIAN ASSISTANT

## 2020-03-17 PROCEDURE — 97112 NEUROMUSCULAR REEDUCATION: CPT

## 2020-03-17 PROCEDURE — 97140 MANUAL THERAPY 1/> REGIONS: CPT

## 2020-03-17 PROCEDURE — 97161 PT EVAL LOW COMPLEX 20 MIN: CPT

## 2020-03-17 RX ORDER — ASPIRIN 325 MG
325 TABLET ORAL DAILY
COMMUNITY
End: 2021-03-10

## 2020-03-17 NOTE — FLOWSHEET NOTE
EXR  [x] (31061) Provided verbal/tactile cueing for activities related to strengthening, flexibility, endurance, ROM for improvements in LE, proximal hip, and core control with self care, mobility, lifting, ambulation.  [] (35603) Provided verbal/tactile cueing for activities related to improving balance, coordination, kinesthetic sense, posture, motor skill, proprioception  to assist with LE, proximal hip, and core control in self care, mobility, lifting, ambulation and eccentric single leg control.      NMR and Therapeutic Activities:    [x] (19362 or 18671) Provided verbal/tactile cueing for activities related to improving balance, coordination, kinesthetic sense, posture, motor skill, proprioception and motor activation to allow for proper function of core, proximal hip and LE with self care and ADLs  [] (87885) Gait Re-education- Provided training and instruction to the patient for proper LE, core and proximal hip recruitment and positioning and eccentric body weight control with ambulation re-education including up and down stairs     Home Exercise Program:    [x] (24065) Reviewed/Progressed HEP activities related to strengthening, flexibility, endurance, ROM of core, proximal hip and LE for functional self-care, mobility, lifting and ambulation/stair navigation   [] (97956)Reviewed/Progressed HEP activities related to improving balance, coordination, kinesthetic sense, posture, motor skill, proprioception of core, proximal hip and LE for self care, mobility, lifting, and ambulation/stair navigation      Manual Treatments:  PROM / STM / Oscillations-Mobs:  G-I, II, III, IV (PA's, Inf., Post.)  [x] (65170) Provided manual therapy to mobilize LE, proximal hip and/or LS spine soft tissue/joints for the purpose of modulating pain, promoting relaxation,  increasing ROM, reducing/eliminating soft tissue swelling/inflammation/restriction, improving soft tissue extensibility and allowing for proper ROM for normal function with self care, mobility, lifting and ambulation. Modalities:     [] GAME READY (VASO)- for significant edema, swelling, pain control. Charges:  Timed Code Treatment Minutes: 61   Total Treatment Minutes: 76     BWC time in/time out: 4:40pm-5:56pm    [x] EVAL (LOW) 02456 (typically 20 minutes face-to-face)  [] EVAL (MOD) 43705 (typically 30 minutes face-to-face)  [] EVAL (HIGH) 61618 (typically 45 minutes face-to-face)  [] RE-EVAL     [x] JG(86541) x 2  (5:26pm-5:56pm)      [] IONTO  [x] NMR (83696) x 1  (5:10pm-5:26pm)    VASO  [x] Manual (71319) x 1   (4:55pm-5:10pm)     [] Other:  [] TA x          [] Mech Traction (65843)  [] ES(attended) (37010)         [] ES (un) (37341):       GOALS:  Patient stated goal: Patient will return to all work related activities without restriction or the onset of right knee pain. [] Progressing: [] Met: [] Not Met: [] Adjusted    Therapist goals for Patient:   Short Term Goals: To be achieved in: 2 weeks  1. Independent in HEP and progression per patient tolerance, in order to prevent re-injury. [] Progressing: [] Met: [] Not Met: [] Adjusted  2. Patient will have a decrease in pain to facilitate improvement in movement, function, and ADLs as indicated by Functional Deficits. [] Progressing: [] Met: [] Not Met: [] Adjusted    Long Term Goals: To be achieved in: 8 weeks  1. Disability index score of 25% or less for the LEFS to assist with reaching prior level of function. [] Progressing: [] Met: [] Not Met: [] Adjusted  2. Patient will demonstrate ROM on affected side equal to the unaffected side to allow for proper joint functioning as indicated by patients Functional Deficits. [] Progressing: [] Met: [] Not Met: [] Adjusted  3. Patient will be able to ascend/descend stairs without restriction or the onset of right knee pain. [] Progressing: [] Met: [] Not Met: [] Adjusted  4.  Patient will return to all household activities without increased symptoms or restriction. [] Progressing: [] Met: [] Not Met: [] Adjusted  5. Patient will be able to perform all lawn mowing activities without restriction or the onset of right knee pain. [] Progressing: [] Met: [] Not Met: [] Adjusted    Progression Towards Functional goals:  [] Patient is progressing as expected towards functional goals listed. [] Progression is slowed due to complexities listed. [] Progression has been slowed due to co-morbidities. [x] Plan just implemented, too soon to assess goals progression  [] Other:         Overall Progression Towards Functional goals/ Treatment Progress Update:  [] Patient is progressing as expected towards functional goals listed. [] Progression is slowed due to complexities/Impairments listed. [] Progression has been slowed due to co-morbidities. [x] Plan just implemented, too soon to assess goals progression <30days   [] Goals require adjustment due to lack of progress  [] Patient is not progressing as expected and requires additional follow up with physician  [] Other    Prognosis for POC: [x] Good [] Fair  [] Poor      Patient requires continued skilled intervention: [x] Yes  [] No    Treatment/Activity Tolerance:  [x] Patient able to complete treatment  [] Patient limited by fatigue  [] Patient limited by pain    [] Patient limited by other medical complications  [] Other:     ASSESSMENT: Patient is a  61 y.o. male s/p  partial right medial menisectomy, chondroplasty of the right patellofemoral joint and medial compartment, and removal of loose bodies on 3/13/20. He would benefit from skilled PT intervention to improve right knee ROM, improve right LE muscular endurance, and improve right LE strength in order to return to his prior level of function and activity without the onset of right knee pain.     Return to Play: (if applicable)   []  Stage 1: Intro to Strength   []  Stage 2: Return to Run and Strength   []  Stage 3: Return to Jump and Strength   []  Stage

## 2020-03-17 NOTE — PLAN OF CARE
some lawn mowing on the side. He would like to work primarily with and HEP for the time being given the coronavirus. Relevant Medical History: Five arthroscopic surgeries on his left knee. Functional Disability Index (LEFS): 90%disability (8/80)    Pain Scale: 4/10  Easing factors: rest  Provocative factors: Sleeping, standing, walking, squatting, stairs, kneeling     Type: []Constant   [x]Intermittent  []Radiating []Localized []other:     Numbness/Tingling: None reported    Occupation/School:     Living Status/Prior Level of Function: Independent with all ADLs and IADLs    OBJECTIVE:     Functional Mobility:    Gait: Inadequate right knee extension in midstance and terminal stance phases of Gait. ROM LEFT RIGHT   Knee ext 0 -5 //-2   Knee Flex 145 119 // 130   Ankle PF          Patellar Mobility     Superior Normal Patella \"floating\" on peripatellar swelling   Inferior normal Patella \"floating\" on peripatellar swelling   Medial Normal Patella \"floating\" on peripatellar swelling   Lateral Normal Patella \"floating\" on peripatellar swelling        Swelling/Effusion            Mild to moderate right peripatellar swelling. Reflexes/Sensation:    []Dermatomes/Myotomes intact    []Reflexes equal and normal bilaterally   [x]Other: No sensory deficits reported    Joint mobility:    []Normal    [x]Hypo   []Hyper    Palpation: TTP of right quadriceps, hamstring, and calf    Bandages/Dressings/Incisions: Surgical ports clean and healing well. [x] Patient history, allergies, meds reviewed. Medical chart reviewed. See intake form. Review Of Systems (ROS):  [x]Performed Review of systems (Integumentary, CardioPulmonary, Neurological) by intake and observation. Intake form has been scanned into medical record. Patient has been instructed to contact their primary care physician regarding ROS issues if not already being addressed at this time.       Co-morbidities/Complexities (which will affect course of rehabilitation):   []None           Arthritic conditions   []Rheumatoid arthritis (M05.9)  []Osteoarthritis (M19.91)   Cardiovascular conditions   []Hypertension (I10)  []Hyperlipidemia (E78.5)  []Angina pectoris (I20)  []Atherosclerosis (I70)   Musculoskeletal conditions   []Disc pathology   []Congenital spine pathologies   []Prior surgical intervention  []Osteoporosis (M81.8)  []Osteopenia (M85.8)   Endocrine conditions   []Hypothyroid (E03.9)  []Hyperthyroid Gastrointestinal conditions   []Constipation (S62.50)   Metabolic conditions   []Morbid obesity (E66.01)  []Diabetes type 1(E10.65) or 2 (E11.65)   []Neuropathy (G60.9)     Pulmonary conditions   []Asthma (J45)  []Coughing   []COPD (J44.9)   Psychological Disorders  []Anxiety (F41.9)  []Depression (F32.9)   []Other:   [x]Other:   Five prior arthroscopic surgeries on left knee. Barriers to/and or personal factors that will affect rehab potential:              [x]Age  []Sex              []Motivation/Lack of Motivation                        []Co-Morbidities              []Cognitive Function, education/learning barriers              []Environmental, home barriers              []profession/work barriers  []past PT/medical experience  [x]other: Low planned frequency of visits. Justification: Age and lower frequency of visits may slow progress. Falls Risk Assessment (30 days):   [x] Falls Risk assessed and no intervention required. [] Falls Risk assessed and Patient requires intervention due to being higher risk   TUG score (>12s at risk):     [] Falls education provided, including         ASSESSMENT: Patient is a  61 y.o. male s/p  partial right medial menisectomy, chondroplasty of the right patellofemoral joint and medial compartment, and removal of loose bodies on 3/13/20.  He would benefit from skilled PT intervention to improve right knee ROM, improve right LE muscular endurance, and improve right LE strength in order to return to from Dry needling      []other:      Prognosis/Rehab Potential:      []Excellent   [x]Good    []Fair   []Poor    Tolerance of evaluation/treatment:    []Excellent   [x]Good    []Fair   []Poor  Physical Therapy Evaluation Complexity Justification  [x] A history of present problem with:  [] no personal factors and/or comorbidities that impact the plan of care;  [x]1-2 personal factors and/or comorbidities that impact the plan of care  []3 personal factors and/or comorbidities that impact the plan of care  [x] An examination of body systems using standardized tests and measures addressing any of the following: body structures and functions (impairments), activity limitations, and/or participation restrictions;:  [] a total of 1-2 or more elements   [x] a total of 3 or more elements   [] a total of 4 or more elements   [x] A clinical presentation with:  [x] stable and/or uncomplicated characteristics   [] evolving clinical presentation with changing characteristics  [] unstable and unpredictable characteristics;   [x] Clinical decision making of [x] low, [] moderate, [] high complexity using standardized patient assessment instrument and/or measurable assessment of functional outcome. [x] EVAL (LOW) 11777 (typically 20 minutes face-to-face)  [] EVAL (MOD) 52926 (typically 30 minutes face-to-face)  [] EVAL (HIGH) 95746 (typically 45 minutes face-to-face)  [] RE-EVAL       PLAN:   Frequency/Duration:  2 days per week for 8 Weeks (beginning 3/17/20, ending 5/12/20)  Interventions:  [x]  Therapeutic exercise including: strength training, ROM, for Lower extremity and core   [x]  NMR activation and proprioception for LE, Glutes and Core   [x]  Manual therapy as indicated for LE, Hip and spine to include: Dry Needling/IASTM, STM, PROM, Gr I-IV mobilizations, manipulation.    [x] Modalities as needed that may include: thermal agents, E-stim, Biofeedback, US, iontophoresis as indicated  [x] Patient education on joint protection, postural re-education, activity modification, progression of HEP. HEP instruction: (see scanned forms)    GOALS:  Patient stated goal: Patient will return to all work related activities without restriction or the onset of right knee pain. [] Progressing: [] Met: [] Not Met: [] Adjusted    Therapist goals for Patient:   Short Term Goals: To be achieved in: 2 weeks  1. Independent in HEP and progression per patient tolerance, in order to prevent re-injury. [] Progressing: [] Met: [] Not Met: [] Adjusted  2. Patient will have a decrease in pain to facilitate improvement in movement, function, and ADLs as indicated by Functional Deficits. [] Progressing: [] Met: [] Not Met: [] Adjusted    Long Term Goals: To be achieved in: 8 weeks  1. Disability index score of 25% or less for the LEFS to assist with reaching prior level of function. [] Progressing: [] Met: [] Not Met: [] Adjusted  2. Patient will demonstrate ROM on affected side equal to the unaffected side to allow for proper joint functioning as indicated by patients Functional Deficits. [] Progressing: [] Met: [] Not Met: [] Adjusted  3. Patient will be able to ascend/descend stairs without restriction or the onset of right knee pain. [] Progressing: [] Met: [] Not Met: [] Adjusted  4. Patient will return to all household activities without increased symptoms or restriction. [] Progressing: [] Met: [] Not Met: [] Adjusted  5. Patient will be able to perform all lawn mowing activities without restriction or the onset of right knee pain.     [] Progressing: [] Met: [] Not Met: [] Adjusted     Electronically signed by:  Kristin Castillo PT

## 2020-03-23 ENCOUNTER — TELEPHONE (OUTPATIENT)
Dept: PHYSICAL THERAPY | Age: 60
End: 2020-03-23

## 2020-03-30 ENCOUNTER — HOSPITAL ENCOUNTER (OUTPATIENT)
Dept: PHYSICAL THERAPY | Age: 60
Setting detail: THERAPIES SERIES
Discharge: HOME | End: 2020-03-30
Payer: COMMERCIAL

## 2020-03-30 PROCEDURE — 97140 MANUAL THERAPY 1/> REGIONS: CPT

## 2020-03-30 PROCEDURE — 97110 THERAPEUTIC EXERCISES: CPT

## 2020-03-30 PROCEDURE — 97112 NEUROMUSCULAR REEDUCATION: CPT

## 2020-03-30 NOTE — FLOWSHEET NOTE
[facility], [address]  Phone: [phone]  Fax [phone]    Physical Therapy Treatment Note/ Progress Report:           Date:  3/30/2020    Patient Name:  Cynthia Angelo    :  1960  MRN: [id number]  Restrictions/Precautions:    Medical/Treatment Diagnosis Information:  · Diagnosis: S83.241 (ICD-10-CM) - Acute medial meniscus tear of right knee; S86.911 (ICD-10-CM) - Strain of right knee and leg  · Treatment Diagnosis: Right knee pain (M25.561); Right knee Stiffness (M25.661); Right Knee Effusion (M25.461); Right Lower Extremity Weakness (I03.26)  Insurance/Certification information:  PT Insurance Information: [facility]  Physician Information:  Referring Practitioner: Katherin Benitez PA-C  Has the plan of care been signed (Y/N):        [x]  Yes  []  No     Date of Patient follow up with Physician: 20      Is this a Progress Report:     []  Yes  [x]  No        If Yes:  Date Range for reporting period:  Beginning: 3/17/20  Ending    Progress report will be due (10 Rx or 30 days whichever is less):        Recertification will be due (POC Duration  / 90 days whichever is less): 20       Visit # Insurance Allowable Requires auth   [address] [place] [address]    []no        [x]yes:     Functional Scale (LEFS):   · 90% disability ()     Latex Allergy:  [x]NO      []YES  Preferred Language for Healthcare:   [x]English       []other:      Pain level:  4/10     SUBJECTIVE:  Reports that he has some tender areas in his R knee, but overall feels pretty good this morning. Reports that his HEP is going well.      OBJECTIVE: See eval   Observation:    Test measurements:      RESTRICTIONS/PRECAUTIONS: None    Exercises/Interventions:     Therapeutic Ex (21416) Volume Notes/CUES   Heelslide + Quadset 5 min    Bridges 3x12, OVL    Clamshells 3x12 bilaterally, OVL    Sidelying Abduction 3x12 bilaterally    Lateral Weight Shifts swelling/inflammation/restriction, improving soft tissue extensibility and allowing for proper ROM for normal function with self care, mobility, lifting and ambulation. Modalities:     [] GAME READY (VASO)- for significant edema, swelling, pain control. Charges:  Timed Code Treatment Minutes: 70   Total Treatment Minutes: 70     [facility] time in/time out: 10:00am-11:10am    [] EVAL (LOW) 87283 (typically 20 minutes face-to-face)  [] EVAL (MOD) 48746 (typically 30 minutes face-to-face)  [] EVAL (HIGH) 44633 (typically 45 minutes face-to-face)  [] RE-EVAL     [x] TZ(67259) x 3  (10:33am-11:10am)     [] IONTO  [x] NMR (27186) x 1  (68:[id number])    VASO  [x] Manual (88639) x 1   (10:00am-10:15am)   [] Other:  [] TA x          [] Mech Traction (52978)  [] ES(attended) (91807)         [] ES (un) (62757):       GOALS:  Patient stated goal: Patient will return to all work related activities without restriction or the onset of right knee pain. [x] Progressing: [] Met: [] Not Met: [] Adjusted    Therapist goals for Patient:   Short Term Goals: To be achieved in: 2 weeks  1. Independent in HEP and progression per patient tolerance, in order to prevent re-injury. [x] Progressing: [] Met: [] Not Met: [] Adjusted  2. Patient will have a decrease in pain to facilitate improvement in movement, function, and ADLs as indicated by Functional Deficits. [x] Progressing: [] Met: [] Not Met: [] Adjusted    Long Term Goals: To be achieved in: 8 weeks  1. Disability index score of 25% or less for the LEFS to assist with reaching prior level of function. [x] Progressing: [] Met: [] Not Met: [] Adjusted  2. Patient will demonstrate ROM on affected side equal to the unaffected side to allow for proper joint functioning as indicated by patients Functional Deficits. [x] Progressing: [] Met: [] Not Met: [] Adjusted  3. Patient will be able to ascend/descend stairs without restriction or the onset of right knee pain.    [x] Progressing: [] Met: [] Not Met: [] Adjusted  4. Patient will return to all household activities without increased symptoms or restriction. [x] Progressing: [] Met: [] Not Met: [] Adjusted  5. Patient will be able to perform all lawn mowing activities without restriction or the onset of right knee pain. [x] Progressing: [] Met: [] Not Met: [] Adjusted    Progression Towards Functional goals:  [] Patient is progressing as expected towards functional goals listed. [] Progression is slowed due to complexities listed. [] Progression has been slowed due to co-morbidities. [x] Plan just implemented, too soon to assess goals progression  [] Other:         Overall Progression Towards Functional goals/ Treatment Progress Update:  [] Patient is progressing as expected towards functional goals listed. [] Progression is slowed due to complexities/Impairments listed. [] Progression has been slowed due to co-morbidities. [x] Plan just implemented, too soon to assess goals progression <30days   [] Goals require adjustment due to lack of progress  [] Patient is not progressing as expected and requires additional follow up with physician  [] Other    Prognosis for POC: [x] Good [] Fair  [] Poor      Patient requires continued skilled intervention: [x] Yes  [] No    Treatment/Activity Tolerance:  [x] Patient able to complete treatment  [] Patient limited by fatigue  [] Patient limited by pain    [] Patient limited by other medical complications  [] Other:     ASSESSMENT: Patient tolerated today's treatment session well and was able to perform all exercises without increase in R knee pain or symptoms. Patient would like to be put on the list for telehealth visits when they become available. PT will continue to follow-up with patient by phone every week.  Patient would benefit from skilled PT intervention to improve right knee ROM, improve right LE muscular endurance, and improve right LE strength in order to return to

## 2020-04-03 ENCOUNTER — TELEPHONE (OUTPATIENT)
Dept: PHYSICAL THERAPY | Age: 60
End: 2020-04-03

## 2020-04-20 ENCOUNTER — OFFICE VISIT (OUTPATIENT)
Dept: ORTHOPEDIC SURGERY | Age: 60
End: 2020-04-20

## 2020-04-20 PROCEDURE — 99024 POSTOP FOLLOW-UP VISIT: CPT | Performed by: ORTHOPAEDIC SURGERY

## 2020-04-20 NOTE — PROGRESS NOTES
Patient is approximately 5-week status post right knee arthroscopy, partial medial meniscectomy, chondroplasty, removal of multiple loose bodies. His preop pain is significantly better. He still has some weakness and mild pain on the medial joint line. Pain Assessment  Location of Pain: Knee  Location Modifiers: Right  Severity of Pain: 4  Quality of Pain: Dull  Duration of Pain: Persistent  Frequency of Pain: Constant  Aggravating Factors: Walking, Standing, Squatting  Limiting Behavior: Some  Relieving Factors: Rest]    On physical exam, the patient has range of motion from full extension to 120 degrees knee flexion. Quad and hamstring strength are 4+ out of 5. There is no knee effusion, no signs of infection or DVT. At this time he has progressive medial compartment wear which is getting worse. I would continue working on quad and hamstring strengthening exercises, avoid twisting and squatting. He will continue on with his therapy for 4 more weeks and be return to work full duty thereafter. I did warn him that he may have persistent knee pain may require medial unicompartmental arthroplasty in the future.

## 2021-03-10 ENCOUNTER — APPOINTMENT (OUTPATIENT)
Dept: CT IMAGING | Age: 61
End: 2021-03-10
Payer: COMMERCIAL

## 2021-03-10 ENCOUNTER — HOSPITAL ENCOUNTER (EMERGENCY)
Age: 61
Discharge: HOME | End: 2021-03-10
Attending: EMERGENCY MEDICINE
Payer: COMMERCIAL

## 2021-03-10 ENCOUNTER — APPOINTMENT (OUTPATIENT)
Dept: GENERAL RADIOLOGY | Age: 61
End: 2021-03-10
Payer: COMMERCIAL

## 2021-03-10 VITALS
HEIGHT: 69 IN | SYSTOLIC BLOOD PRESSURE: 142 MMHG | RESPIRATION RATE: 19 BRPM | HEART RATE: 85 BPM | BODY MASS INDEX: 25.77 KG/M2 | TEMPERATURE: 99.5 F | WEIGHT: 174 LBS | DIASTOLIC BLOOD PRESSURE: 86 MMHG | OXYGEN SATURATION: 94 %

## 2021-03-10 DIAGNOSIS — J18.9 PNEUMONIA DUE TO ORGANISM: Primary | ICD-10-CM

## 2021-03-10 LAB
A/G RATIO: 2.1 (ref 1.1–2.2)
ALBUMIN SERPL-MCNC: 4.7 G/DL (ref 3.4–5)
ALP BLD-CCNC: 61 U/L (ref 40–129)
ALT SERPL-CCNC: 17 U/L (ref 10–40)
ANION GAP SERPL CALCULATED.3IONS-SCNC: 9 MMOL/L (ref 3–16)
AST SERPL-CCNC: 14 U/L (ref 15–37)
BASOPHILS ABSOLUTE: 0.1 K/UL (ref 0–0.2)
BASOPHILS RELATIVE PERCENT: 0.4 %
BILIRUB SERPL-MCNC: 0.6 MG/DL (ref 0–1)
BILIRUBIN URINE: NEGATIVE
BLOOD, URINE: NEGATIVE
BUN BLDV-MCNC: 14 MG/DL (ref 7–20)
CALCIUM SERPL-MCNC: 10.2 MG/DL (ref 8.3–10.6)
CHLORIDE BLD-SCNC: 104 MMOL/L (ref 99–110)
CLARITY: CLEAR
CO2: 26 MMOL/L (ref 21–32)
COLOR: YELLOW
CREAT SERPL-MCNC: 0.9 MG/DL (ref 0.8–1.3)
EKG ATRIAL RATE: 81 BPM
EKG DIAGNOSIS: NORMAL
EKG P AXIS: 41 DEGREES
EKG P-R INTERVAL: 128 MS
EKG Q-T INTERVAL: 360 MS
EKG QRS DURATION: 98 MS
EKG QTC CALCULATION (BAZETT): 418 MS
EKG R AXIS: 54 DEGREES
EKG T AXIS: 33 DEGREES
EKG VENTRICULAR RATE: 81 BPM
EOSINOPHILS ABSOLUTE: 0 K/UL (ref 0–0.6)
EOSINOPHILS RELATIVE PERCENT: 0.1 %
GFR AFRICAN AMERICAN: >60
GFR NON-AFRICAN AMERICAN: >60
GLOBULIN: 2.2 G/DL
GLUCOSE BLD-MCNC: 101 MG/DL (ref 70–99)
GLUCOSE URINE: NEGATIVE MG/DL
HCT VFR BLD CALC: 45.3 % (ref 40.5–52.5)
HEMOGLOBIN: 15.6 G/DL (ref 13.5–17.5)
KETONES, URINE: NEGATIVE MG/DL
LACTIC ACID, SEPSIS: 1.3 MMOL/L (ref 0.4–1.9)
LEUKOCYTE ESTERASE, URINE: NEGATIVE
LYMPHOCYTES ABSOLUTE: 0.5 K/UL (ref 1–5.1)
LYMPHOCYTES RELATIVE PERCENT: 2.8 %
MCH RBC QN AUTO: 29.8 PG (ref 26–34)
MCHC RBC AUTO-ENTMCNC: 34.3 G/DL (ref 31–36)
MCV RBC AUTO: 86.8 FL (ref 80–100)
MICROSCOPIC EXAMINATION: NORMAL
MONOCYTES ABSOLUTE: 1.2 K/UL (ref 0–1.3)
MONOCYTES RELATIVE PERCENT: 6 %
NEUTROPHILS ABSOLUTE: 17.3 K/UL (ref 1.7–7.7)
NEUTROPHILS RELATIVE PERCENT: 90.7 %
NITRITE, URINE: NEGATIVE
PDW BLD-RTO: 13.4 % (ref 12.4–15.4)
PH UA: 6.5 (ref 5–8)
PLATELET # BLD: 230 K/UL (ref 135–450)
PMV BLD AUTO: 8 FL (ref 5–10.5)
POTASSIUM SERPL-SCNC: 4 MMOL/L (ref 3.5–5.1)
PROTEIN UA: NEGATIVE MG/DL
RAPID INFLUENZA  B AGN: NEGATIVE
RAPID INFLUENZA A AGN: NEGATIVE
RBC # BLD: 5.23 M/UL (ref 4.2–5.9)
SARS-COV-2, NAAT: NOT DETECTED
SODIUM BLD-SCNC: 139 MMOL/L (ref 136–145)
SPECIFIC GRAVITY UA: <=1.005 (ref 1–1.03)
TOTAL PROTEIN: 6.9 G/DL (ref 6.4–8.2)
TROPONIN: <0.01 NG/ML
URINE TYPE: NORMAL
UROBILINOGEN, URINE: 0.2 E.U./DL
WBC # BLD: 19.1 K/UL (ref 4–11)

## 2021-03-10 PROCEDURE — 93010 ELECTROCARDIOGRAM REPORT: CPT | Performed by: INTERNAL MEDICINE

## 2021-03-10 PROCEDURE — 83605 ASSAY OF LACTIC ACID: CPT

## 2021-03-10 PROCEDURE — 71045 X-RAY EXAM CHEST 1 VIEW: CPT

## 2021-03-10 PROCEDURE — 2580000003 HC RX 258: Performed by: EMERGENCY MEDICINE

## 2021-03-10 PROCEDURE — 81003 URINALYSIS AUTO W/O SCOPE: CPT

## 2021-03-10 PROCEDURE — 80053 COMPREHEN METABOLIC PANEL: CPT

## 2021-03-10 PROCEDURE — 85025 COMPLETE CBC W/AUTO DIFF WBC: CPT

## 2021-03-10 PROCEDURE — 93005 ELECTROCARDIOGRAM TRACING: CPT | Performed by: EMERGENCY MEDICINE

## 2021-03-10 PROCEDURE — 74177 CT ABD & PELVIS W/CONTRAST: CPT

## 2021-03-10 PROCEDURE — 87635 SARS-COV-2 COVID-19 AMP PRB: CPT

## 2021-03-10 PROCEDURE — 84484 ASSAY OF TROPONIN QUANT: CPT

## 2021-03-10 PROCEDURE — 87804 INFLUENZA ASSAY W/OPTIC: CPT

## 2021-03-10 PROCEDURE — 71260 CT THORAX DX C+: CPT

## 2021-03-10 PROCEDURE — 99284 EMERGENCY DEPT VISIT MOD MDM: CPT

## 2021-03-10 PROCEDURE — 96365 THER/PROPH/DIAG IV INF INIT: CPT

## 2021-03-10 PROCEDURE — 87040 BLOOD CULTURE FOR BACTERIA: CPT

## 2021-03-10 PROCEDURE — 6360000002 HC RX W HCPCS: Performed by: EMERGENCY MEDICINE

## 2021-03-10 PROCEDURE — 6360000004 HC RX CONTRAST MEDICATION: Performed by: EMERGENCY MEDICINE

## 2021-03-10 RX ORDER — M-VIT,TX,IRON,MINS/CALC/FOLIC 27MG-0.4MG
1 TABLET ORAL DAILY
COMMUNITY

## 2021-03-10 RX ORDER — LEVOFLOXACIN 750 MG/1
750 TABLET ORAL DAILY
Qty: 5 TABLET | Refills: 0 | Status: SHIPPED | OUTPATIENT
Start: 2021-03-10 | End: 2021-03-15

## 2021-03-10 RX ORDER — 0.9 % SODIUM CHLORIDE 0.9 %
1000 INTRAVENOUS SOLUTION INTRAVENOUS ONCE
Status: COMPLETED | OUTPATIENT
Start: 2021-03-10 | End: 2021-03-10

## 2021-03-10 RX ORDER — LEVOFLOXACIN 5 MG/ML
500 INJECTION, SOLUTION INTRAVENOUS ONCE
Status: COMPLETED | OUTPATIENT
Start: 2021-03-10 | End: 2021-03-10

## 2021-03-10 RX ADMIN — SODIUM CHLORIDE 1000 ML: 9 INJECTION, SOLUTION INTRAVENOUS at 09:57

## 2021-03-10 RX ADMIN — LEVOFLOXACIN 500 MG: 5 INJECTION, SOLUTION INTRAVENOUS at 12:40

## 2021-03-10 RX ADMIN — IOPAMIDOL 75 ML: 755 INJECTION, SOLUTION INTRAVENOUS at 11:08

## 2021-03-10 ASSESSMENT — ENCOUNTER SYMPTOMS
RHINORRHEA: 0
VOMITING: 0
NAUSEA: 0
WHEEZING: 0
COUGH: 1
ABDOMINAL PAIN: 1
SHORTNESS OF BREATH: 1
PHOTOPHOBIA: 0
BACK PAIN: 0
DIARRHEA: 0

## 2021-03-10 ASSESSMENT — PAIN DESCRIPTION - PAIN TYPE: TYPE: ACUTE PAIN

## 2021-03-10 NOTE — ED NOTES

## 2021-03-10 NOTE — ED NOTES
Blood culture set #1 drawn from [facility] with butterfly. Bottle tops scrubbed with alcohol pads. Site prepped with [employer] swab, 15 seconds per side, and allowed to dry for 30 seconds prior to venipuncture. Red waste tube drawn prior to collection of specimen. Blood culture set #2 drawn from [address] [place] [address] with butterfly. Bottle tops scrubbed with alcohol pads. Site prepped with [employer] swab, 15 seconds per side, and allowed to dry for 30 seconds prior to venipuncture. Red waste tube drawn prior to collection of specimen.          Nori Doty RN  03/10/21 2108

## 2021-03-10 NOTE — ED PROVIDER NOTES
Emergency Department Provider Note  Location: [address] [place] [address]  ED  3/10/2021     Patient Identification  Jimmy Singh is a 61 y.o. male    Chief Complaint  Fever (Pt states he coughed at 0300 and felt something in the right side of his chest. Pt the began with a fever as high as 101.8. He was coughing up blood ), Chest Pain, and Hemoptysis          HPI  (History provided by patient)  Patient is a 80-year-old male generally healthy who presents with chest discomfort fevers myalgias and an episode of hemoptysis. 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 Patient reports he woke up at 3 AM and had \"a really really strong sneeze\". Patient reports after that coughed up some blood-tinged sputum. Denies any nausea or pain. States he has a nonspecific chest discomfort but denies any overt pain. There is no exertional symptoms no diaphoresis no autonomic symptoms. Patient does state that she feels slightly more short of breath than normal.  No known exposure to COVID-19 or sick contacts and he has been vaccinated x2 for Covid completed 1 month ago. Denies any leg pain leg swelling no history of DVT or PE no recent immobility. He also complains of left inguinal pain that started abruptly again after sneezing. He has a history of multiple hernia repairs. I have reviewed the following nursing documentation:  Allergies: No Known Allergies    Past medical history:  has a past medical history of Arthritis and Wears glasses. Past surgical history:  has a past surgical history that includes knee surgery (2008, 2010); Carpal tunnel release (Right, 2104,6037 (approx)); cyst removal (2007); Knee arthroscopy (2014); hernia repair (1992); Colonoscopy (10/22/2012); Knee arthroscopy (Left, 12/24/14); other surgical history (Left, 4/20/16); Knee arthroscopy (Right, 3/8/2019); and Knee arthroscopy (Right, 3/13/2020). Home medications:   Prior to Admission medications    Medication Sig Start Date End Date Taking? Authorizing Provider   Multiple Vitamins-Minerals (THERAPEUTIC MULTIVITAMIN-MINERALS) tablet Take 1 tablet by mouth daily   Yes Historical Provider, MD   levoFLOXacin (LEVAQUIN) 750 MG tablet Take 1 tablet by mouth daily for 5 days 3/10/21 3/15/21 Yes Ahsan Caba MD   Omega-3 Fatty Acids (OMEGA-3 FISH OIL PO) Take by mouth   Yes Historical Provider, MD       Social history:  reports that he quit smoking about 30 years ago. He has never used smokeless tobacco. He reports that he does not drink alcohol or use drugs. Family history:    Family History   Problem Relation Age of Onset    Diabetes Father     Heart Disease Father     High Blood Pressure Father     Mult Sclerosis Paternal Uncle          ROS  Review of Systems   Constitutional: Positive for chills and fever. HENT: Negative for congestion and rhinorrhea. Eyes: Negative for photophobia and visual disturbance. Respiratory: Positive for cough and shortness of breath. Negative for wheezing. Cardiovascular: Negative for chest pain and palpitations. Gastrointestinal: Positive for abdominal pain. Negative for diarrhea, nausea and vomiting. Genitourinary: Negative for dysuria and hematuria. Musculoskeletal: Negative for back pain and neck pain. Skin: Negative for rash and wound. Neurological: Negative for syncope and weakness. Psychiatric/Behavioral: Negative for agitation and confusion. Exam  ED Triage Vitals   BP Temp Temp Source Pulse Resp SpO2 Height Weight   03/10/21 0838 03/10/21 0838 03/10/21 0838 03/10/21 0838 03/10/21 0838 03/10/21 0838 03/10/21 0835 03/10/21 0835   (!) 167/97 99.5 °F (37.5 °C) Oral 87 19 96 % 5' 9\" (1.753 m) 174 lb (78.9 kg)       Physical Exam  Vitals signs and nursing note reviewed. Constitutional:       General: He is not in acute distress. Appearance: He is well-developed. HENT:      Head: Normocephalic and atraumatic. Nose: Nose normal. No congestion.    Eyes:      Extraocular Movements: Extraocular movements intact. Pupils: Pupils are equal, round, and reactive to light. Neck:      Musculoskeletal: Normal range of motion and neck supple. Cardiovascular:      Rate and Rhythm: Normal rate and regular rhythm. Heart sounds: No murmur. Pulmonary:      Effort: Pulmonary effort is normal.      Breath sounds: Normal breath sounds. Abdominal:      General: There is no distension. Palpations: Abdomen is soft. Tenderness: There is no abdominal tenderness. Musculoskeletal: Normal range of motion. General: No deformity. Skin:     General: Skin is warm. Findings: No rash. Neurological:      Mental Status: He is alert and oriented to person, place, and time. Motor: No abnormal muscle tone.       Coordination: Coordination normal.   Psychiatric:         Mood and Affect: Mood normal.         Behavior: Behavior normal.           ED Course    ED Medication Orders (From admission, onward)    Start Ordered     Status Ordering Provider    03/10/21 1215 03/10/21 1206  levoFLOXacin (LEVAQUIN) 500 MG/100ML infusion 500 mg  ONCE     Question:  Antimicrobial Indications  Answer:  Pneumonia (CAP)    Last MAR action: Stopped - by Austin Fay on 03/10/21 at 1356 V[address]CTOR TOLLIVER    03/10/21 1001 03/10/21 1001  iopamidol (ISOVUE-370) 76 % injection 75 mL  IMG ONCE PRN      Last MAR action: Given - by Jonathan Kelly on 03/10/21 at 1108 V[address]CTOR TOLLIVER    03/10/21 0930 03/10/21 0918  0.9 % sodium chloride bolus  ONCE      Last MAR action: Stopped - by Austin Fay on 03/10/21 at 1147 VÍCTOR TOLLIVER          EKG  Normal sinus rhythm rate of 80 normal axis normal intervals no evidence of conduction abnormalities or diagnostic ischemic changes noted,       Radiology  Ct Abdomen Pelvis W Iv Contrast Additional Contrast? None    Result Date: 3/10/2021  EXAMINATION: CT OF THE ABDOMEN AND PELVIS WITH CONTRAST; CTA OF THE CHEST 3/10/2021 10:47 am TECHNIQUE: CT of the abdomen and pelvis was performed with the administration of intravenous contrast. Multiplanar reformatted images are provided for review. Dose modulation, iterative reconstruction, and/or weight based adjustment of the mA/kV was utilized to reduce the radiation dose to as low as reasonably achievable.; CTA of the chest was performed after the administration of intravenous contrast.  Multiplanar reformatted images are provided for review. MIP images are provided for review. Dose modulation, iterative reconstruction, and/or weight based adjustment of the mA/kV was utilized to reduce the radiation dose to as low as reasonably achievable. COMPARISON: None. HISTORY: ORDERING SYSTEM PROVIDED HISTORY: L inguinal pain TECHNOLOGIST PROVIDED HISTORY: Additional Contrast?->None Reason for exam:->L inguinal pain Decision Support Exception->Emergency Medical Condition (MA) Reason for Exam: left inguinal pain after sneezing Acuity: Acute Type of Exam: Initial Relevant Medical/Surgical History: bilateral inguinal hernia repair; ORDERING SYSTEM PROVIDED HISTORY: CP TECHNOLOGIST PROVIDED HISTORY: Reason for exam:->CP Decision Support Exception->Emergency Medical Condition (MA) Reason for Exam: chest pain with hemoptysis after sneezing this am-some sob Additional signs and symptoms: former smoker x 10-12 yrs Relevant Medical/Surgical History: no surg FINDINGS: Pulmonary Arteries: Pulmonary arteries are adequately opacified for evaluation. No evidence of intraluminal filling defect to suggest pulmonary embolism. Main pulmonary artery is normal in caliber. Mediastinum: No evidence of mediastinal lymphadenopathy. The heart and pericardium demonstrate no acute abnormality. There is no acute abnormality of the thoracic aorta. Thyroid appears normal. Lungs/pleura:  Moderate amount of ground-glass alveolar opacity present within the right upper lobe, right middle lobe, and right lower lobe consistent with multi lobe acute pneumonia. The left lung is clear except for slight dependent atelectasis. No abnormal pulmonary vascular congestion. No pleural effusion. No suspicious pulmonary nodule. Airways are clear. Soft Tissues/Bones: No acute bone or soft tissue abnormality. Organs: Liver is normal in appearance without worrisome focal lesion. Small simple cyst superiorly in the left kidney with measured density of 9 HU. Other abdominal organs appear normal. GI tract: Normal appendix and terminal ileum. No evident diverticular disease. Normal amount of stool. Pelvis: Moderate enlargement of prostate. Urinary bladder appears normal. No mass, adenopathy, or ascites. Retroperitoneum: No mass, adenopathy, or ascites. Normal size of the aorta. No free air. Bones and soft tissues: Tiny fatty umbilical hernia. Small direct non inflamed left inguinal hernia present. No acute bony abnormality identified. No evidence of pulmonary embolism. Large multi lobe right pneumonia. No pleural effusion or adenopathy. Clear left lung. No acute intra-abdominal abnormality identified. Small direct left inguinal hernia. No other significant abnormality identified. Xr Chest Portable    Result Date: 3/10/2021  EXAMINATION: ONE XRAY VIEW OF THE CHEST 3/10/2021 8:52 am COMPARISON: None HISTORY: ORDERING SYSTEM PROVIDED HISTORY: chest pain TECHNOLOGIST PROVIDED HISTORY: Reason for exam:->chest pain Reason for Exam: chest pain Acuity: Acute Type of Exam: Initial FINDINGS: The cardiomediastinal silhouette is normal in size. Lung volumes are low. Subtle increased opacity in the right parahilar region could represent evolving airspace disease. The left lung is clear. No pleural effusion or pneumothorax. Subtle asymmetric increased opacity involving the right parahilar region which could indicate an evolving infiltrate. Radiographic follow-up recommended to assure resolution.      Ct Chest Pulmonary Embolism W Contrast    Result Date: 3/10/2021  EXAMINATION: CT OF THE ABDOMEN AND PELVIS WITH CONTRAST; CTA OF THE CHEST 3/10/2021 10:47 am TECHNIQUE: CT of the abdomen and pelvis was performed with the administration of intravenous contrast. Multiplanar reformatted images are provided for review. Dose modulation, iterative reconstruction, and/or weight based adjustment of the mA/kV was utilized to reduce the radiation dose to as low as reasonably achievable.; CTA of the chest was performed after the administration of intravenous contrast.  Multiplanar reformatted images are provided for review. MIP images are provided for review. Dose modulation, iterative reconstruction, and/or weight based adjustment of the mA/kV was utilized to reduce the radiation dose to as low as reasonably achievable. COMPARISON: None. HISTORY: ORDERING SYSTEM PROVIDED HISTORY: L inguinal pain TECHNOLOGIST PROVIDED HISTORY: Additional Contrast?->None Reason for exam:->L inguinal pain Decision Support Exception->Emergency Medical Condition (MA) Reason for Exam: left inguinal pain after sneezing Acuity: Acute Type of Exam: Initial Relevant Medical/Surgical History: bilateral inguinal hernia repair; ORDERING SYSTEM PROVIDED HISTORY: CP TECHNOLOGIST PROVIDED HISTORY: Reason for exam:->CP Decision Support Exception->Emergency Medical Condition (MA) Reason for Exam: chest pain with hemoptysis after sneezing this am-some sob Additional signs and symptoms: former smoker x 10-12 yrs Relevant Medical/Surgical History: no surg FINDINGS: Pulmonary Arteries: Pulmonary arteries are adequately opacified for evaluation. No evidence of intraluminal filling defect to suggest pulmonary embolism. Main pulmonary artery is normal in caliber. Mediastinum: No evidence of mediastinal lymphadenopathy. The heart and pericardium demonstrate no acute abnormality. There is no acute abnormality of the thoracic aorta. Thyroid appears normal. Lungs/pleura:  Moderate amount of ground-glass alveolar opacity present within the right upper lobe, right middle lobe, and right lower lobe consistent with multi lobe acute pneumonia. The left lung is clear except for slight dependent atelectasis. No abnormal pulmonary vascular congestion. No pleural effusion. No suspicious pulmonary nodule. Airways are clear. Soft Tissues/Bones: No acute bone or soft tissue abnormality. Organs: Liver is normal in appearance without worrisome focal lesion. Small simple cyst superiorly in the left kidney with measured density of 9 HU. Other abdominal organs appear normal. GI tract: Normal appendix and terminal ileum. No evident diverticular disease. Normal amount of stool. Pelvis: Moderate enlargement of prostate. Urinary bladder appears normal. No mass, adenopathy, or ascites. Retroperitoneum: No mass, adenopathy, or ascites. Normal size of the aorta. No free air. Bones and soft tissues: Tiny fatty umbilical hernia. Small direct non inflamed left inguinal hernia present. No acute bony abnormality identified. No evidence of pulmonary embolism. Large multi lobe right pneumonia. No pleural effusion or adenopathy. Clear left lung. No acute intra-abdominal abnormality identified. Small direct left inguinal hernia. No other significant abnormality identified.          Labs  Results for orders placed or performed during the hospital encounter of 03/10/21   Rapid influenza A/B antigens    Specimen: Nasopharyngeal   Result Value Ref Range    Rapid Influenza A Ag Negative Negative    Rapid Influenza B Ag Negative Negative   SARS-CoV-2 NAAT (Rapid)    Specimen: Nasopharyngeal Swab   Result Value Ref Range    SARS-CoV-2, NAAT Not Detected Not Detected   CBC auto differential   Result Value Ref Range    WBC 19.1 (H) 4.0 - 11.0 K/uL    RBC 5.23 4.20 - 5.90 M/uL    Hemoglobin 15.6 13.5 - 17.5 g/dL    Hematocrit 45.3 40.5 - 52.5 %    MCV 86.8 80.0 - 100.0 fL    MCH 29.8 26.0 - 34.0 pg    MCHC 34.3 31.0 - 36.0 g/dL    RDW 13.4 12.4 - 15.4 %    Platelets 415 328 - 919 K/uL    MPV 8.0 5.0 - 10.5 fL    Neutrophils % 90.7 %    Lymphocytes % 2.8 %    Monocytes % 6.0 %    Eosinophils % 0.1 %    Basophils % 0.4 %    Neutrophils Absolute 17.3 (H) 1.7 - 7.7 K/uL    Lymphocytes Absolute 0.5 (L) 1.0 - 5.1 K/uL    Monocytes Absolute 1.2 0.0 - 1.3 K/uL    Eosinophils Absolute 0.0 0.0 - 0.6 K/uL    Basophils Absolute 0.1 0.0 - 0.2 K/uL   Comprehensive metabolic panel   Result Value Ref Range    Sodium 139 136 - 145 mmol/L    Potassium 4.0 3.5 - 5.1 mmol/L    Chloride 104 99 - 110 mmol/L    CO2 26 21 - 32 mmol/L    Anion Gap 9 3 - 16    Glucose 101 (H) 70 - 99 mg/dL    BUN 14 7 - 20 mg/dL    CREATININE 0.9 0.8 - 1.3 mg/dL    GFR Non-African American >60 >60    GFR African American >60 >60    Calcium 10.2 8.3 - 10.6 mg/dL    Total Protein 6.9 6.4 - 8.2 g/dL    Albumin 4.7 3.4 - 5.0 g/dL    Albumin/Globulin Ratio 2.1 1.1 - 2.2    Total Bilirubin 0.6 0.0 - 1.0 mg/dL    Alkaline Phosphatase 61 40 - 129 U/L    ALT 17 10 - 40 U/L    AST 14 (L) 15 - 37 U/L    Globulin 2.2 g/dL   Troponin   Result Value Ref Range    Troponin <0.01 <0.01 ng/mL   Urinalysis, reflex to microscopic   Result Value Ref Range    Color, UA Yellow Straw/Yellow    Clarity, UA Clear Clear    Glucose, Ur Negative Negative mg/dL    Bilirubin Urine Negative Negative    Ketones, Urine Negative Negative mg/dL    Specific Gravity, UA <=1.005 1.005 - 1.030    Blood, Urine Negative Negative    pH, UA 6.5 5.0 - 8.0    Protein, UA Negative Negative mg/dL    Urobilinogen, Urine 0.2 <2.0 E.U./dL    Nitrite, Urine Negative Negative    Leukocyte Esterase, Urine Negative Negative    Microscopic Examination Not Indicated     Urine Type NotGiven    Lactate, Sepsis   Result Value Ref Range    Lactic Acid, Sepsis 1.3 0.4 - 1.9 mmol/L   EKG 12 Lead   Result Value Ref Range    Ventricular Rate 81 BPM    Atrial Rate 81 BPM    P-R Interval 128 ms    QRS Duration 98 ms    Q-T Interval 360 ms    QTc Calculation (Bazett) 418 ms    P Axis 41 degrees    R Axis 54 degrees    T Axis 33 degrees    Diagnosis       Normal sinus rhythmNormal ECGWhen compared with ECG of 24-NOV-2008 12:00,No significant change was found         MDM  Patient seen and evaluated. Relevant records reviewed. Patient is a 60-year-old male who presents with shortness of breath chest discomfort fevers chills myalgias and episode of hemoptysis. On exam he is well-appearing no acute distress vital signs are overall reassuring. He has a nonfocal and overall unremarkable physical exam.  Chest x-ray showed questionable right-sided infiltrate. Patient did describe a pleuritic chest discomfort and I am concerned for pneumonia she was sent for CT of the chest which shows evidence of lobar pneumonia on the right side. Treated with Levaquin here in the emergency department and will continue dosing at home. There is no evidence of inguinal hernia either on exam or imaging. Low concern for ACS or other acute cardiopulmonary process requiring further intervention at this time. Patient is otherwise well-appearing I believe to be safe for discharge. I discussed the return precautions with the patient. He is agreeable to plan expressed understanding of plan. Clinical Impression:  1. Pneumonia due to organism          Disposition:  Discharge to home in good condition. Blood pressure (!) 142/86, pulse 85, temperature 99.5 °F (37.5 °C), temperature source Oral, resp. rate 19, height 5' 9\" (1.753 m), weight 174 lb (78.9 kg), SpO2 94 %. Patient was given scripts for the following medications. I counseled patient how to take these medications.    Discharge Medication List as of 3/10/2021  1:36 PM      START taking these medications    Details   levoFLOXacin (LEVAQUIN) 750 MG tablet Take 1 tablet by mouth daily for 5 days, Disp-5 tablet, R-0Normal             Disposition referral (if applicable):  Natalie Kong MD  [phone]  [phone]    Schedule an appointment as soon as possible for a visit in 1 week          Total critical care time is 0 minutes, which excludes separately billable procedures and updating family. Time spent is specifically for management of the presenting complaint and symptoms initially, direct bedside care, reevaluation, review of records, and consultation. There was a high probability of clinically significant life-threatening deterioration in the patient's condition, which required my urgent intervention. This chart was generated in part by using Dragon Dictation system and may contain errors related to that system including errors in grammar, punctuation, and spelling, as well as words and phrases that may be inappropriate. If there are any questions or concerns please feel free to contact the dictating provider for clarification.      Falguni Jeffries MD  [id number] MD  03/10/21 1832

## 2021-03-10 NOTE — ED NOTES
--Patient provided with discharge instructions. --Instructions, and follow-up appointments reviewed with patient/family. No further questions or needs at this time. --Vital signs and patient stable upon discharge. --Patient ambulatory to [facility].         Kaylah Velazquez RN  03/10/21 4260

## 2021-03-14 LAB
BLOOD CULTURE, ROUTINE: NORMAL
CULTURE, BLOOD 2: NORMAL

## 2021-06-02 ENCOUNTER — CLINICAL DOCUMENTATION (OUTPATIENT)
Dept: OTHER | Age: 61
End: 2021-06-02

## (undated) DEVICE — 3M™ STERI-STRIP™ COMPOUND BENZOIN TINCTURE 40 BAGS/CARTON 4 CARTONS/CASE C1544: Brand: 3M™ STERI-STRIP™

## (undated) DEVICE — 4-PORT MANIFOLD: Brand: NEPTUNE 2

## (undated) DEVICE — DRAPE 54X23IN MAYO STAND COVER REINF

## (undated) DEVICE — CONVERTED USE 304929 SPONGE GAUZE CURITY 4X4IN 16-PLY ST

## (undated) DEVICE — PADDING CAST W6INXL4YD ST COT RAYON MICROPLEATED HIGHLY

## (undated) DEVICE — TUBE IRRIG L8IN LNG PT W/ CONN FOR PMP SYS REDEUCE

## (undated) DEVICE — 3M™ STERI-STRIP™ REINFORCED ADHESIVE SKIN CLOSURES, R1547, 1/2 IN X 4 IN (12 MM X 100 MM), 6 STRIPS/ENVELOPE: Brand: 3M™ STERI-STRIP™

## (undated) DEVICE — GLOVE SURG SZ 65 L12IN FNGR THK94MIL STD WHT LTX FREE

## (undated) DEVICE — GAUZE,SPONGE,4"X4",16PLY,STRL,LF,10/TRAY: Brand: MEDLINE

## (undated) DEVICE — SET ADMIN PRIMING 7ML L30IN 7.35LB 20 GTT 2ND RLER CLMP

## (undated) DEVICE — GLOVE SURG SZ 85 L12IN FNGR THK94MIL STD WHT LTX FREE

## (undated) DEVICE — DRAPE ARTHRO 90X124IN KNEE SMS FAB EXT FLD COLL PCH RESIST

## (undated) DEVICE — ZIMMER® STERILE DISPOSABLE TOURNIQUET CUFF WITH PLC, DUAL PORT, SINGLE BLADDER, 30 IN. (76 CM)

## (undated) DEVICE — CATHETER IV 20GA L1.25IN PNK FEP SFTY STR HUB RADPQ DISP

## (undated) DEVICE — SOLUTION IV 1000ML LAC RINGERS PH 6.5 INJ USP VIAFLX PLAS

## (undated) DEVICE — GLOVE SURG SZ 8 L12IN FNGR THK94MIL STD WHT LTX FREE

## (undated) DEVICE — ELECTRODE,ECG,STRESS,FOAM,3PK: Brand: MEDLINE

## (undated) DEVICE — BANDAGE COMPR W6INXL5YD HI E BGE W/ CLP SURE-WRAP

## (undated) DEVICE — 3.5 MM INCISOR STRAIGHT BLADES,                                    POWER/EP-1, MEDIUM GRAY, PACKAGED 6                                    PER BOX, STERILE

## (undated) DEVICE — GLOVE SURG SZ 75 L12IN FNGR THK94MIL STD WHT LTX FREE

## (undated) DEVICE — 3M™ TEGADERM™ TRANSPARENT FILM DRESSING FRAME STYLE, 1624W, 2-3/8 IN X 2-3/4 IN (6 CM X 7 CM), 100/CT 4CT/CASE: Brand: 3M™ TEGADERM™

## (undated) DEVICE — GLOVE,SURG,SENSICARE,ALOE,LF,PF,7: Brand: MEDLINE

## (undated) DEVICE — SUTURE MCRYL SZ 4-0 L18IN ABSRB UD L19MM PS-2 3/8 CIR PRIM Y496G

## (undated) DEVICE — SET GRAV VENT NVENT CK VLV 3 NDL FREE PRT 10 GTT

## (undated) DEVICE — PADDING CAST W4INXL4YD NONSTERILE COT RAYON MICROPLEATED

## (undated) DEVICE — COMFO-TEX ELASTIC BANDAGE LATEX FREE, 6INX5YD: Brand: COMFO-TEX™

## (undated) DEVICE — SOLUTION IRRIG 5L LAC R BG

## (undated) DEVICE — PACK PROCEDURE SURG ARTHSCP CUST